# Patient Record
Sex: FEMALE | Race: BLACK OR AFRICAN AMERICAN | Employment: FULL TIME | URBAN - METROPOLITAN AREA
[De-identification: names, ages, dates, MRNs, and addresses within clinical notes are randomized per-mention and may not be internally consistent; named-entity substitution may affect disease eponyms.]

---

## 2017-03-31 PROBLEM — Z34.90 NORMAL PREGNANCY: Status: ACTIVE | Noted: 2017-03-31

## 2018-05-21 PROBLEM — Z37.9 NORMAL LABOR: Status: ACTIVE | Noted: 2018-05-21

## 2018-05-21 PROBLEM — Z34.90 NORMAL PREGNANCY: Status: RESOLVED | Noted: 2017-03-31 | Resolved: 2018-05-21

## 2018-05-29 PROBLEM — R51.9 HEADACHE: Status: ACTIVE | Noted: 2018-05-29

## 2019-06-10 LAB
HEP B, EXTERNAL RESULT: NEGATIVE
HIV, EXTERNAL RESULT: NORMAL
RPR, EXTERNAL RESULT: NEGATIVE
RUBELLA TITER, EXTERNAL RESULT: NORMAL

## 2019-11-22 LAB — GBS, EXTERNAL RESULT: POSITIVE

## 2019-12-23 ENCOUNTER — ANESTHESIA (OUTPATIENT)
Dept: LABOR AND DELIVERY | Age: 34
End: 2019-12-23
Payer: COMMERCIAL

## 2019-12-23 ENCOUNTER — HOSPITAL ENCOUNTER (INPATIENT)
Age: 34
LOS: 2 days | Discharge: HOME | End: 2019-12-25
Attending: OBSTETRICS & GYNECOLOGY | Admitting: OBSTETRICS & GYNECOLOGY
Payer: COMMERCIAL

## 2019-12-23 ENCOUNTER — ANESTHESIA EVENT (OUTPATIENT)
Dept: LABOR AND DELIVERY | Age: 34
End: 2019-12-23
Payer: COMMERCIAL

## 2019-12-23 ENCOUNTER — HOSPITAL ENCOUNTER (OUTPATIENT)
Age: 34
Discharge: HOME | End: 2019-12-23
Attending: OBSTETRICS & GYNECOLOGY | Admitting: OBSTETRICS & GYNECOLOGY
Payer: COMMERCIAL

## 2019-12-23 VITALS
SYSTOLIC BLOOD PRESSURE: 118 MMHG | TEMPERATURE: 97 F | HEART RATE: 85 BPM | HEIGHT: 65 IN | WEIGHT: 205 LBS | DIASTOLIC BLOOD PRESSURE: 69 MMHG | RESPIRATION RATE: 18 BRPM | BODY MASS INDEX: 34.16 KG/M2

## 2019-12-23 LAB
ABO/RH: NORMAL
AMPHETAMINE SCREEN, URINE: NORMAL
ANTIBODY SCREEN: NORMAL
BARBITURATE SCREEN URINE: NORMAL
BASOPHILS ABSOLUTE: 0 K/UL (ref 0–0.2)
BASOPHILS RELATIVE PERCENT: 0.3 %
BENZODIAZEPINE SCREEN, URINE: NORMAL
BUPRENORPHINE URINE: NORMAL
CANNABINOID SCREEN URINE: NORMAL
COCAINE METABOLITE SCREEN URINE: NORMAL
EOSINOPHILS ABSOLUTE: 0 K/UL (ref 0–0.6)
EOSINOPHILS RELATIVE PERCENT: 0.5 %
HCT VFR BLD CALC: 34.4 % (ref 36–48)
HEMOGLOBIN: 11.1 G/DL (ref 12–16)
LYMPHOCYTES ABSOLUTE: 2.5 K/UL (ref 1–5.1)
LYMPHOCYTES RELATIVE PERCENT: 30.5 %
Lab: NORMAL
MCH RBC QN AUTO: 26.5 PG (ref 26–34)
MCHC RBC AUTO-ENTMCNC: 32.4 G/DL (ref 31–36)
MCV RBC AUTO: 81.8 FL (ref 80–100)
METHADONE SCREEN, URINE: NORMAL
MONOCYTES ABSOLUTE: 0.5 K/UL (ref 0–1.3)
MONOCYTES RELATIVE PERCENT: 6.3 %
NEUTROPHILS ABSOLUTE: 5 K/UL (ref 1.7–7.7)
NEUTROPHILS RELATIVE PERCENT: 62.4 %
OPIATE SCREEN URINE: NORMAL
OXYCODONE URINE: NORMAL
PDW BLD-RTO: 17.2 % (ref 12.4–15.4)
PH UA: 6.5
PHENCYCLIDINE SCREEN URINE: NORMAL
PLATELET # BLD: 286 K/UL (ref 135–450)
PMV BLD AUTO: 6.9 FL (ref 5–10.5)
PROPOXYPHENE SCREEN: NORMAL
RBC # BLD: 4.2 M/UL (ref 4–5.2)
TOTAL SYPHILLIS IGG/IGM: NORMAL
WBC # BLD: 8.1 K/UL (ref 4–11)

## 2019-12-23 PROCEDURE — 6360000002 HC RX W HCPCS: Performed by: OBSTETRICS & GYNECOLOGY

## 2019-12-23 PROCEDURE — 3700000025 EPIDURAL BLOCK: Performed by: ANESTHESIOLOGY

## 2019-12-23 PROCEDURE — 7200000001 HC VAGINAL DELIVERY

## 2019-12-23 PROCEDURE — 99212 OFFICE O/P EST SF 10 MIN: CPT

## 2019-12-23 PROCEDURE — 86901 BLOOD TYPING SEROLOGIC RH(D): CPT

## 2019-12-23 PROCEDURE — 51701 INSERT BLADDER CATHETER: CPT

## 2019-12-23 PROCEDURE — 86780 TREPONEMA PALLIDUM: CPT

## 2019-12-23 PROCEDURE — 86900 BLOOD TYPING SEROLOGIC ABO: CPT

## 2019-12-23 PROCEDURE — 80307 DRUG TEST PRSMV CHEM ANLYZR: CPT

## 2019-12-23 PROCEDURE — 6360000002 HC RX W HCPCS: Performed by: ANESTHESIOLOGY

## 2019-12-23 PROCEDURE — 86850 RBC ANTIBODY SCREEN: CPT

## 2019-12-23 PROCEDURE — 2580000003 HC RX 258: Performed by: OBSTETRICS & GYNECOLOGY

## 2019-12-23 PROCEDURE — 85025 COMPLETE CBC W/AUTO DIFF WBC: CPT

## 2019-12-23 PROCEDURE — 2500000003 HC RX 250 WO HCPCS: Performed by: NURSE ANESTHETIST, CERTIFIED REGISTERED

## 2019-12-23 PROCEDURE — 59025 FETAL NON-STRESS TEST: CPT

## 2019-12-23 PROCEDURE — 6370000000 HC RX 637 (ALT 250 FOR IP): Performed by: OBSTETRICS & GYNECOLOGY

## 2019-12-23 PROCEDURE — 1220000000 HC SEMI PRIVATE OB R&B

## 2019-12-23 PROCEDURE — 2500000003 HC RX 250 WO HCPCS

## 2019-12-23 RX ORDER — HYDROCODONE BITARTRATE AND ACETAMINOPHEN 5; 325 MG/1; MG/1
1 TABLET ORAL EVERY 4 HOURS PRN
Status: DISCONTINUED | OUTPATIENT
Start: 2019-12-23 | End: 2019-12-25 | Stop reason: HOSPADM

## 2019-12-23 RX ORDER — HYDROCODONE BITARTRATE AND ACETAMINOPHEN 5; 325 MG/1; MG/1
2 TABLET ORAL EVERY 4 HOURS PRN
Status: DISCONTINUED | OUTPATIENT
Start: 2019-12-23 | End: 2019-12-25 | Stop reason: HOSPADM

## 2019-12-23 RX ORDER — ONDANSETRON 2 MG/ML
4 INJECTION INTRAMUSCULAR; INTRAVENOUS EVERY 6 HOURS PRN
Status: DISCONTINUED | OUTPATIENT
Start: 2019-12-23 | End: 2019-12-23 | Stop reason: HOSPADM

## 2019-12-23 RX ORDER — SODIUM CHLORIDE 0.9 % (FLUSH) 0.9 %
10 SYRINGE (ML) INJECTION EVERY 12 HOURS SCHEDULED
Status: DISCONTINUED | OUTPATIENT
Start: 2019-12-23 | End: 2019-12-25 | Stop reason: HOSPADM

## 2019-12-23 RX ORDER — ACETAMINOPHEN 325 MG/1
650 TABLET ORAL EVERY 4 HOURS PRN
Status: DISCONTINUED | OUTPATIENT
Start: 2019-12-23 | End: 2019-12-23 | Stop reason: HOSPADM

## 2019-12-23 RX ORDER — ONDANSETRON 2 MG/ML
4 INJECTION INTRAMUSCULAR; INTRAVENOUS EVERY 6 HOURS PRN
Status: DISCONTINUED | OUTPATIENT
Start: 2019-12-23 | End: 2019-12-23 | Stop reason: ALTCHOICE

## 2019-12-23 RX ORDER — SODIUM CHLORIDE 0.9 % (FLUSH) 0.9 %
10 SYRINGE (ML) INJECTION PRN
Status: DISCONTINUED | OUTPATIENT
Start: 2019-12-23 | End: 2019-12-25 | Stop reason: HOSPADM

## 2019-12-23 RX ORDER — DIPHENHYDRAMINE HYDROCHLORIDE 50 MG/ML
25 INJECTION INTRAMUSCULAR; INTRAVENOUS EVERY 6 HOURS PRN
Status: DISCONTINUED | OUTPATIENT
Start: 2019-12-23 | End: 2019-12-23 | Stop reason: ALTCHOICE

## 2019-12-23 RX ORDER — SODIUM CHLORIDE, SODIUM LACTATE, POTASSIUM CHLORIDE, CALCIUM CHLORIDE 600; 310; 30; 20 MG/100ML; MG/100ML; MG/100ML; MG/100ML
INJECTION, SOLUTION INTRAVENOUS CONTINUOUS
Status: DISCONTINUED | OUTPATIENT
Start: 2019-12-23 | End: 2019-12-25 | Stop reason: HOSPADM

## 2019-12-23 RX ORDER — IBUPROFEN 400 MG/1
800 TABLET ORAL EVERY 8 HOURS
Status: DISCONTINUED | OUTPATIENT
Start: 2019-12-23 | End: 2019-12-25 | Stop reason: HOSPADM

## 2019-12-23 RX ORDER — EPHEDRINE SULFATE 50 MG/ML
INJECTION INTRAVENOUS PRN
Status: DISCONTINUED | OUTPATIENT
Start: 2019-12-23 | End: 2019-12-23 | Stop reason: SDUPTHER

## 2019-12-23 RX ORDER — BUPIVACAINE HYDROCHLORIDE 2.5 MG/ML
INJECTION, SOLUTION EPIDURAL; INFILTRATION; INTRACAUDAL PRN
Status: DISCONTINUED | OUTPATIENT
Start: 2019-12-23 | End: 2019-12-23 | Stop reason: SDUPTHER

## 2019-12-23 RX ORDER — METHYLERGONOVINE MALEATE 0.2 MG/ML
200 INJECTION INTRAVENOUS
Status: COMPLETED | OUTPATIENT
Start: 2019-12-23 | End: 2019-12-23

## 2019-12-23 RX ORDER — SODIUM CHLORIDE 0.9 % (FLUSH) 0.9 %
10 SYRINGE (ML) INJECTION PRN
Status: DISCONTINUED | OUTPATIENT
Start: 2019-12-23 | End: 2019-12-23 | Stop reason: SDUPTHER

## 2019-12-23 RX ORDER — ACETAMINOPHEN 325 MG/1
650 TABLET ORAL EVERY 4 HOURS PRN
Status: DISCONTINUED | OUTPATIENT
Start: 2019-12-23 | End: 2019-12-23 | Stop reason: SDUPTHER

## 2019-12-23 RX ORDER — NALBUPHINE HCL 10 MG/ML
5 AMPUL (ML) INJECTION EVERY 4 HOURS PRN
Status: DISCONTINUED | OUTPATIENT
Start: 2019-12-23 | End: 2019-12-23 | Stop reason: ALTCHOICE

## 2019-12-23 RX ORDER — NALOXONE HYDROCHLORIDE 0.4 MG/ML
0.4 INJECTION, SOLUTION INTRAMUSCULAR; INTRAVENOUS; SUBCUTANEOUS PRN
Status: DISCONTINUED | OUTPATIENT
Start: 2019-12-23 | End: 2019-12-23 | Stop reason: ALTCHOICE

## 2019-12-23 RX ORDER — LANOLIN 100 %
OINTMENT (GRAM) TOPICAL PRN
Status: DISCONTINUED | OUTPATIENT
Start: 2019-12-23 | End: 2019-12-25 | Stop reason: HOSPADM

## 2019-12-23 RX ORDER — LIDOCAINE HYDROCHLORIDE AND EPINEPHRINE 20; 5 MG/ML; UG/ML
INJECTION, SOLUTION EPIDURAL; INFILTRATION; INTRACAUDAL; PERINEURAL PRN
Status: DISCONTINUED | OUTPATIENT
Start: 2019-12-23 | End: 2019-12-23 | Stop reason: SDUPTHER

## 2019-12-23 RX ORDER — SODIUM CHLORIDE 0.9 % (FLUSH) 0.9 %
10 SYRINGE (ML) INJECTION EVERY 12 HOURS SCHEDULED
Status: DISCONTINUED | OUTPATIENT
Start: 2019-12-23 | End: 2019-12-23 | Stop reason: SDUPTHER

## 2019-12-23 RX ORDER — DOCUSATE SODIUM 100 MG/1
100 CAPSULE, LIQUID FILLED ORAL 2 TIMES DAILY
Status: DISCONTINUED | OUTPATIENT
Start: 2019-12-23 | End: 2019-12-25 | Stop reason: HOSPADM

## 2019-12-23 RX ORDER — ACETAMINOPHEN 325 MG/1
650 TABLET ORAL EVERY 4 HOURS PRN
Status: DISCONTINUED | OUTPATIENT
Start: 2019-12-23 | End: 2019-12-25 | Stop reason: HOSPADM

## 2019-12-23 RX ADMIN — METHYLERGONOVINE MALEATE 200 MCG: 0.2 INJECTION, SOLUTION INTRAMUSCULAR; INTRAVENOUS at 10:27

## 2019-12-23 RX ADMIN — Medication 999 ML/HR: at 08:50

## 2019-12-23 RX ADMIN — SODIUM CHLORIDE, POTASSIUM CHLORIDE, SODIUM LACTATE AND CALCIUM CHLORIDE: 600; 310; 30; 20 INJECTION, SOLUTION INTRAVENOUS at 06:23

## 2019-12-23 RX ADMIN — Medication 10 ML: at 21:31

## 2019-12-23 RX ADMIN — SODIUM CHLORIDE, POTASSIUM CHLORIDE, SODIUM LACTATE AND CALCIUM CHLORIDE: 600; 310; 30; 20 INJECTION, SOLUTION INTRAVENOUS at 05:45

## 2019-12-23 RX ADMIN — BUPIVACAINE HYDROCHLORIDE 4 ML: 2.5 INJECTION, SOLUTION EPIDURAL; INFILTRATION; INTRACAUDAL at 06:38

## 2019-12-23 RX ADMIN — DEXTROSE MONOHYDRATE 5 MILLION UNITS: 5 INJECTION INTRAVENOUS at 06:34

## 2019-12-23 RX ADMIN — ACETAMINOPHEN 650 MG: 325 TABLET ORAL at 19:23

## 2019-12-23 RX ADMIN — LIDOCAINE HYDROCHLORIDE,EPINEPHRINE BITARTRATE 5 ML: 20; .005 INJECTION, SOLUTION EPIDURAL; INFILTRATION; INTRACAUDAL; PERINEURAL at 07:08

## 2019-12-23 RX ADMIN — SODIUM CHLORIDE, POTASSIUM CHLORIDE, SODIUM LACTATE AND CALCIUM CHLORIDE: 600; 310; 30; 20 INJECTION, SOLUTION INTRAVENOUS at 08:24

## 2019-12-23 RX ADMIN — BUPIVACAINE HYDROCHLORIDE 4 ML: 2.5 INJECTION, SOLUTION EPIDURAL; INFILTRATION; INTRACAUDAL at 06:35

## 2019-12-23 RX ADMIN — EPHEDRINE SULFATE 10 MG: 50 INJECTION INTRAVENOUS at 08:16

## 2019-12-23 RX ADMIN — DOCUSATE SODIUM 100 MG: 100 CAPSULE, LIQUID FILLED ORAL at 21:31

## 2019-12-23 RX ADMIN — ONDANSETRON 4 MG: 2 INJECTION INTRAMUSCULAR; INTRAVENOUS at 08:16

## 2019-12-23 ASSESSMENT — PAIN SCALES - GENERAL
PAINLEVEL_OUTOF10: 3
PAINLEVEL_OUTOF10: 4

## 2019-12-23 ASSESSMENT — PAIN DESCRIPTION - DESCRIPTORS
DESCRIPTORS: CRAMPING
DESCRIPTORS: CRAMPING;PRESSURE

## 2019-12-24 PROCEDURE — 6370000000 HC RX 637 (ALT 250 FOR IP): Performed by: OBSTETRICS & GYNECOLOGY

## 2019-12-24 PROCEDURE — 1220000000 HC SEMI PRIVATE OB R&B

## 2019-12-24 RX ADMIN — DOCUSATE SODIUM 100 MG: 100 CAPSULE, LIQUID FILLED ORAL at 19:58

## 2019-12-24 RX ADMIN — ACETAMINOPHEN 650 MG: 325 TABLET ORAL at 15:49

## 2019-12-24 RX ADMIN — ACETAMINOPHEN 650 MG: 325 TABLET ORAL at 19:58

## 2019-12-24 RX ADMIN — IBUPROFEN 800 MG: 400 TABLET, FILM COATED ORAL at 17:28

## 2019-12-24 RX ADMIN — IBUPROFEN 800 MG: 400 TABLET, FILM COATED ORAL at 09:42

## 2019-12-24 RX ADMIN — DOCUSATE SODIUM 100 MG: 100 CAPSULE, LIQUID FILLED ORAL at 09:42

## 2019-12-24 RX ADMIN — IBUPROFEN 800 MG: 400 TABLET, FILM COATED ORAL at 01:09

## 2019-12-24 ASSESSMENT — PAIN DESCRIPTION - FREQUENCY: FREQUENCY: INTERMITTENT

## 2019-12-24 ASSESSMENT — PAIN SCALES - GENERAL
PAINLEVEL_OUTOF10: 6
PAINLEVEL_OUTOF10: 5
PAINLEVEL_OUTOF10: 5
PAINLEVEL_OUTOF10: 4
PAINLEVEL_OUTOF10: 0

## 2019-12-24 ASSESSMENT — PAIN DESCRIPTION - LOCATION: LOCATION: ABDOMEN

## 2019-12-24 ASSESSMENT — PAIN DESCRIPTION - PAIN TYPE: TYPE: ACUTE PAIN

## 2019-12-24 ASSESSMENT — PAIN DESCRIPTION - PROGRESSION: CLINICAL_PROGRESSION: NOT CHANGED

## 2019-12-24 ASSESSMENT — PAIN DESCRIPTION - DESCRIPTORS: DESCRIPTORS: CRAMPING

## 2019-12-24 ASSESSMENT — PAIN - FUNCTIONAL ASSESSMENT: PAIN_FUNCTIONAL_ASSESSMENT: ACTIVITIES ARE NOT PREVENTED

## 2019-12-24 ASSESSMENT — PAIN DESCRIPTION - ORIENTATION: ORIENTATION: LOWER

## 2019-12-24 ASSESSMENT — PAIN DESCRIPTION - ONSET: ONSET: OTHER (COMMENT)

## 2019-12-25 VITALS
DIASTOLIC BLOOD PRESSURE: 65 MMHG | WEIGHT: 205 LBS | TEMPERATURE: 97.2 F | BODY MASS INDEX: 34.16 KG/M2 | HEIGHT: 65 IN | SYSTOLIC BLOOD PRESSURE: 103 MMHG | HEART RATE: 74 BPM | RESPIRATION RATE: 12 BRPM | OXYGEN SATURATION: 100 %

## 2019-12-25 PROCEDURE — 6370000000 HC RX 637 (ALT 250 FOR IP): Performed by: OBSTETRICS & GYNECOLOGY

## 2019-12-25 RX ADMIN — ACETAMINOPHEN 650 MG: 325 TABLET ORAL at 05:52

## 2019-12-25 RX ADMIN — DOCUSATE SODIUM 100 MG: 100 CAPSULE, LIQUID FILLED ORAL at 08:09

## 2019-12-25 RX ADMIN — IBUPROFEN 800 MG: 400 TABLET, FILM COATED ORAL at 01:58

## 2019-12-25 ASSESSMENT — PAIN DESCRIPTION - LOCATION
LOCATION: ABDOMEN

## 2019-12-25 ASSESSMENT — PAIN DESCRIPTION - PAIN TYPE
TYPE: ACUTE PAIN

## 2019-12-25 ASSESSMENT — PAIN - FUNCTIONAL ASSESSMENT
PAIN_FUNCTIONAL_ASSESSMENT: ACTIVITIES ARE NOT PREVENTED

## 2019-12-25 ASSESSMENT — PAIN DESCRIPTION - FREQUENCY
FREQUENCY: INTERMITTENT

## 2019-12-25 ASSESSMENT — PAIN SCALES - GENERAL
PAINLEVEL_OUTOF10: 0
PAINLEVEL_OUTOF10: 1
PAINLEVEL_OUTOF10: 0

## 2019-12-25 ASSESSMENT — PAIN DESCRIPTION - PROGRESSION
CLINICAL_PROGRESSION: NOT CHANGED
CLINICAL_PROGRESSION: GRADUALLY WORSENING
CLINICAL_PROGRESSION: RESOLVED

## 2019-12-25 ASSESSMENT — PAIN DESCRIPTION - ONSET
ONSET: ON-GOING
ONSET: ON-GOING

## 2019-12-25 ASSESSMENT — PAIN DESCRIPTION - ORIENTATION
ORIENTATION: LOWER

## 2019-12-25 ASSESSMENT — PAIN DESCRIPTION - DESCRIPTORS
DESCRIPTORS: CRAMPING

## 2019-12-27 ENCOUNTER — HOSPITAL ENCOUNTER (OUTPATIENT)
Age: 34
Discharge: HOME | End: 2019-12-27
Attending: OBSTETRICS & GYNECOLOGY | Admitting: OBSTETRICS & GYNECOLOGY
Payer: COMMERCIAL

## 2019-12-27 VITALS
HEIGHT: 65 IN | SYSTOLIC BLOOD PRESSURE: 130 MMHG | HEART RATE: 85 BPM | TEMPERATURE: 99.3 F | BODY MASS INDEX: 34.16 KG/M2 | DIASTOLIC BLOOD PRESSURE: 73 MMHG | WEIGHT: 205 LBS | RESPIRATION RATE: 18 BRPM

## 2019-12-27 LAB
BASOPHILS ABSOLUTE: 0 K/UL (ref 0–0.2)
BASOPHILS RELATIVE PERCENT: 0.3 %
BILIRUBIN URINE: NEGATIVE
BLOOD, URINE: ABNORMAL
CLARITY: ABNORMAL
COLOR: ABNORMAL
EOSINOPHILS ABSOLUTE: 0.1 K/UL (ref 0–0.6)
EOSINOPHILS RELATIVE PERCENT: 1.4 %
EPITHELIAL CELLS, UA: 4 /HPF (ref 0–5)
GLUCOSE URINE: NEGATIVE MG/DL
HCT VFR BLD CALC: 34.7 % (ref 36–48)
HEMOGLOBIN: 11.3 G/DL (ref 12–16)
KETONES, URINE: NEGATIVE MG/DL
LEUKOCYTE ESTERASE, URINE: ABNORMAL
LYMPHOCYTES ABSOLUTE: 2.1 K/UL (ref 1–5.1)
LYMPHOCYTES RELATIVE PERCENT: 21.8 %
MCH RBC QN AUTO: 26.4 PG (ref 26–34)
MCHC RBC AUTO-ENTMCNC: 32.6 G/DL (ref 31–36)
MCV RBC AUTO: 81 FL (ref 80–100)
MICROSCOPIC EXAMINATION: YES
MONOCYTES ABSOLUTE: 0.5 K/UL (ref 0–1.3)
MONOCYTES RELATIVE PERCENT: 4.9 %
NEUTROPHILS ABSOLUTE: 6.8 K/UL (ref 1.7–7.7)
NEUTROPHILS RELATIVE PERCENT: 71.6 %
NITRITE, URINE: NEGATIVE
PDW BLD-RTO: 17 % (ref 12.4–15.4)
PH UA: 8 (ref 5–8)
PLATELET # BLD: 271 K/UL (ref 135–450)
PMV BLD AUTO: 6.8 FL (ref 5–10.5)
PROTEIN UA: 100 MG/DL
RBC # BLD: 4.28 M/UL (ref 4–5.2)
RBC UA: >100 /HPF (ref 0–2)
SPECIFIC GRAVITY UA: 1.01 (ref 1–1.03)
URINE TYPE: ABNORMAL
UROBILINOGEN, URINE: 0.2 E.U./DL
WBC # BLD: 9.5 K/UL (ref 4–11)
WBC UA: 387 /HPF (ref 0–5)

## 2019-12-27 PROCEDURE — 81001 URINALYSIS AUTO W/SCOPE: CPT

## 2019-12-27 PROCEDURE — 6370000000 HC RX 637 (ALT 250 FOR IP): Performed by: OBSTETRICS & GYNECOLOGY

## 2019-12-27 PROCEDURE — 99213 OFFICE O/P EST LOW 20 MIN: CPT

## 2019-12-27 PROCEDURE — 85025 COMPLETE CBC W/AUTO DIFF WBC: CPT

## 2019-12-27 PROCEDURE — 51701 INSERT BLADDER CATHETER: CPT

## 2019-12-27 PROCEDURE — G0463 HOSPITAL OUTPT CLINIC VISIT: HCPCS

## 2019-12-27 RX ORDER — ACETAMINOPHEN 325 MG/1
1000 TABLET ORAL ONCE
COMMUNITY

## 2019-12-27 RX ORDER — IBUPROFEN 400 MG/1
800 TABLET ORAL EVERY 8 HOURS PRN
Status: DISCONTINUED | OUTPATIENT
Start: 2019-12-27 | End: 2019-12-27 | Stop reason: HOSPADM

## 2019-12-27 RX ORDER — ACETAMINOPHEN 500 MG
1000 TABLET ORAL EVERY 6 HOURS PRN
Status: DISCONTINUED | OUTPATIENT
Start: 2019-12-27 | End: 2019-12-27 | Stop reason: HOSPADM

## 2019-12-27 RX ORDER — IBUPROFEN 800 MG/1
800 TABLET ORAL ONCE
COMMUNITY
End: 2021-07-28

## 2019-12-27 RX ADMIN — IBUPROFEN 800 MG: 400 TABLET, FILM COATED ORAL at 17:43

## 2019-12-27 ASSESSMENT — PAIN DESCRIPTION - RADICULAR PAIN: RADICULAR_PAIN: ABSENT

## 2019-12-27 ASSESSMENT — PAIN DESCRIPTION - DESCRIPTORS
DESCRIPTORS: DISCOMFORT
DESCRIPTORS: DISCOMFORT

## 2019-12-27 ASSESSMENT — PAIN SCALES - GENERAL: PAINLEVEL_OUTOF10: 6

## 2020-03-28 ENCOUNTER — HOSPITAL ENCOUNTER (EMERGENCY)
Age: 35
Discharge: HOME | End: 2020-03-28
Payer: COMMERCIAL

## 2020-03-28 ENCOUNTER — APPOINTMENT (OUTPATIENT)
Dept: GENERAL RADIOLOGY | Age: 35
End: 2020-03-28
Payer: COMMERCIAL

## 2020-03-28 VITALS
DIASTOLIC BLOOD PRESSURE: 82 MMHG | SYSTOLIC BLOOD PRESSURE: 122 MMHG | WEIGHT: 183 LBS | HEIGHT: 65 IN | BODY MASS INDEX: 30.49 KG/M2 | RESPIRATION RATE: 12 BRPM | OXYGEN SATURATION: 98 % | HEART RATE: 99 BPM | TEMPERATURE: 102.1 F

## 2020-03-28 LAB
ANION GAP SERPL CALCULATED.3IONS-SCNC: 10 MMOL/L (ref 3–16)
BASOPHILS ABSOLUTE: 0 K/UL (ref 0–0.2)
BASOPHILS RELATIVE PERCENT: 0.3 %
BUN BLDV-MCNC: 12 MG/DL (ref 7–20)
CALCIUM SERPL-MCNC: 8.9 MG/DL (ref 8.3–10.6)
CHLORIDE BLD-SCNC: 103 MMOL/L (ref 99–110)
CO2: 23 MMOL/L (ref 21–32)
CREAT SERPL-MCNC: 0.9 MG/DL (ref 0.6–1.1)
EOSINOPHILS ABSOLUTE: 0 K/UL (ref 0–0.6)
EOSINOPHILS RELATIVE PERCENT: 0.1 %
GFR AFRICAN AMERICAN: >60
GFR NON-AFRICAN AMERICAN: >60
GLUCOSE BLD-MCNC: 90 MG/DL (ref 70–99)
HCG QUALITATIVE: NEGATIVE
HCT VFR BLD CALC: 41 % (ref 36–48)
HEMOGLOBIN: 13.6 G/DL (ref 12–16)
LYMPHOCYTES ABSOLUTE: 1.1 K/UL (ref 1–5.1)
LYMPHOCYTES RELATIVE PERCENT: 11.7 %
MCH RBC QN AUTO: 27.3 PG (ref 26–34)
MCHC RBC AUTO-ENTMCNC: 33.1 G/DL (ref 31–36)
MCV RBC AUTO: 82.5 FL (ref 80–100)
MONO TEST: NEGATIVE
MONOCYTES ABSOLUTE: 0.4 K/UL (ref 0–1.3)
MONOCYTES RELATIVE PERCENT: 4.8 %
NEUTROPHILS ABSOLUTE: 7.8 K/UL (ref 1.7–7.7)
NEUTROPHILS RELATIVE PERCENT: 83.1 %
PDW BLD-RTO: 16.7 % (ref 12.4–15.4)
PLATELET # BLD: 215 K/UL (ref 135–450)
PMV BLD AUTO: 7.4 FL (ref 5–10.5)
POTASSIUM SERPL-SCNC: 3.9 MMOL/L (ref 3.5–5.1)
RAPID INFLUENZA  B AGN: NEGATIVE
RAPID INFLUENZA A AGN: NEGATIVE
RBC # BLD: 4.96 M/UL (ref 4–5.2)
S PYO AG THROAT QL: POSITIVE
SODIUM BLD-SCNC: 136 MMOL/L (ref 136–145)
WBC # BLD: 9.3 K/UL (ref 4–11)

## 2020-03-28 PROCEDURE — 87880 STREP A ASSAY W/OPTIC: CPT

## 2020-03-28 PROCEDURE — 96374 THER/PROPH/DIAG INJ IV PUSH: CPT

## 2020-03-28 PROCEDURE — 6370000000 HC RX 637 (ALT 250 FOR IP): Performed by: PHYSICIAN ASSISTANT

## 2020-03-28 PROCEDURE — 80048 BASIC METABOLIC PNL TOTAL CA: CPT

## 2020-03-28 PROCEDURE — 6360000002 HC RX W HCPCS: Performed by: PHYSICIAN ASSISTANT

## 2020-03-28 PROCEDURE — 99283 EMERGENCY DEPT VISIT LOW MDM: CPT

## 2020-03-28 PROCEDURE — 85025 COMPLETE CBC W/AUTO DIFF WBC: CPT

## 2020-03-28 PROCEDURE — 71045 X-RAY EXAM CHEST 1 VIEW: CPT

## 2020-03-28 PROCEDURE — 36415 COLL VENOUS BLD VENIPUNCTURE: CPT

## 2020-03-28 PROCEDURE — 87804 INFLUENZA ASSAY W/OPTIC: CPT

## 2020-03-28 PROCEDURE — 86308 HETEROPHILE ANTIBODY SCREEN: CPT

## 2020-03-28 PROCEDURE — 2580000003 HC RX 258: Performed by: PHYSICIAN ASSISTANT

## 2020-03-28 PROCEDURE — 96361 HYDRATE IV INFUSION ADD-ON: CPT

## 2020-03-28 PROCEDURE — 84703 CHORIONIC GONADOTROPIN ASSAY: CPT

## 2020-03-28 RX ORDER — IBUPROFEN 600 MG/1
600 TABLET ORAL EVERY 6 HOURS PRN
Qty: 120 TABLET | Refills: 0 | Status: SHIPPED | OUTPATIENT
Start: 2020-03-28

## 2020-03-28 RX ORDER — DEXAMETHASONE SODIUM PHOSPHATE 10 MG/ML
10 INJECTION, SOLUTION INTRAMUSCULAR; INTRAVENOUS ONCE
Status: COMPLETED | OUTPATIENT
Start: 2020-03-28 | End: 2020-03-28

## 2020-03-28 RX ORDER — AMOXICILLIN AND CLAVULANATE POTASSIUM 875; 125 MG/1; MG/1
1 TABLET, FILM COATED ORAL 2 TIMES DAILY
Qty: 20 TABLET | Refills: 0 | Status: SHIPPED | OUTPATIENT
Start: 2020-03-28 | End: 2020-04-07

## 2020-03-28 RX ORDER — KETOROLAC TROMETHAMINE 30 MG/ML
30 INJECTION, SOLUTION INTRAMUSCULAR; INTRAVENOUS ONCE
Status: COMPLETED | OUTPATIENT
Start: 2020-03-28 | End: 2020-03-28

## 2020-03-28 RX ORDER — ACETAMINOPHEN 500 MG
1000 TABLET ORAL ONCE
Status: COMPLETED | OUTPATIENT
Start: 2020-03-28 | End: 2020-03-28

## 2020-03-28 RX ORDER — AMOXICILLIN AND CLAVULANATE POTASSIUM 875; 125 MG/1; MG/1
1 TABLET, FILM COATED ORAL ONCE
Status: COMPLETED | OUTPATIENT
Start: 2020-03-28 | End: 2020-03-28

## 2020-03-28 RX ORDER — 0.9 % SODIUM CHLORIDE 0.9 %
1000 INTRAVENOUS SOLUTION INTRAVENOUS ONCE
Status: COMPLETED | OUTPATIENT
Start: 2020-03-28 | End: 2020-03-28

## 2020-03-28 RX ORDER — ACETAMINOPHEN 325 MG/1
650 TABLET ORAL EVERY 6 HOURS PRN
Qty: 120 TABLET | Refills: 0 | Status: SHIPPED | OUTPATIENT
Start: 2020-03-28 | End: 2021-07-28

## 2020-03-28 RX ADMIN — ACETAMINOPHEN 1000 MG: 500 TABLET ORAL at 13:18

## 2020-03-28 RX ADMIN — AMOXICILLIN AND CLAVULANATE POTASSIUM 1 TABLET: 875; 125 TABLET, FILM COATED ORAL at 14:33

## 2020-03-28 RX ADMIN — DEXAMETHASONE SODIUM PHOSPHATE 10 MG: 10 INJECTION, SOLUTION INTRAMUSCULAR; INTRAVENOUS at 14:33

## 2020-03-28 RX ADMIN — SODIUM CHLORIDE 1000 ML: 9 INJECTION, SOLUTION INTRAVENOUS at 13:18

## 2020-03-28 RX ADMIN — KETOROLAC TROMETHAMINE 30 MG: 30 INJECTION, SOLUTION INTRAMUSCULAR at 14:33

## 2020-03-28 ASSESSMENT — ENCOUNTER SYMPTOMS
TROUBLE SWALLOWING: 0
SINUS PRESSURE: 0
SORE THROAT: 1
COUGH: 0
BACK PAIN: 0
SINUS PAIN: 0
CHEST TIGHTNESS: 0
DIARRHEA: 0
ABDOMINAL PAIN: 0
COLOR CHANGE: 0
RHINORRHEA: 0
NAUSEA: 0
VOMITING: 0
VOICE CHANGE: 0
EYE REDNESS: 0
CONSTIPATION: 0
SHORTNESS OF BREATH: 0
EYE DISCHARGE: 0
RESPIRATORY NEGATIVE: 1

## 2020-03-28 ASSESSMENT — PAIN SCALES - GENERAL
PAINLEVEL_OUTOF10: 7
PAINLEVEL_OUTOF10: 7

## 2020-03-28 ASSESSMENT — PAIN DESCRIPTION - DESCRIPTORS: DESCRIPTORS: DISCOMFORT

## 2020-03-28 ASSESSMENT — PAIN DESCRIPTION - LOCATION: LOCATION: THROAT

## 2020-03-28 ASSESSMENT — PAIN DESCRIPTION - PAIN TYPE: TYPE: ACUTE PAIN

## 2020-03-28 NOTE — ED PROVIDER NOTES
Performed at:  [facility]  [address]  [place], [address] [facility]   Phone [phone]   HCG, SERUM, QUALITATIVE    Narrative:     Performed at:  [facility]  [address],  [place], [address]   Phone [phone]       All other labs were within normal range or not returned as of this dictation. EKG: All EKG's are interpreted by the Emergency Department Physician in the absence of a cardiologist.  Please see their note for interpretation of EKG. RADIOLOGY:   Non-plain film images such as CT, Ultrasound and MRI are read by the radiologist. Plain radiographic images are visualized and preliminarily interpreted by the ED Provider with the below findings:        Interpretation per the Radiologist below, if available at the time of this note:    XR CHEST PORTABLE   Final Result   No acute process. Xr Chest Portable    Result Date: 3/28/2020  EXAMINATION: ONE XRAY VIEW OF THE CHEST 3/28/2020 1:05 pm COMPARISON: None. HISTORY: ORDERING SYSTEM PROVIDED HISTORY: fever TECHNOLOGIST PROVIDED HISTORY: Reason for exam:->fever Reason for Exam: Pharyngitis (pt c/o fever and sore throat since last night. Pt temp 102.1 during triage. Pt denies SOB or cough. ) Acuity: Unknown Type of Exam: Unknown FINDINGS: The lungs are without acute focal process. There is no effusion or pneumothorax. The cardiomediastinal silhouette is without acute process. The osseous structures are without acute process. No acute process.            PROCEDURES   Unless otherwise noted below, none     Procedures    CRITICAL CARE TIME   N/A    CONSULTS:  None      EMERGENCY DEPARTMENT COURSE and DIFFERENTIAL DIAGNOSIS/MDM:   Vitals:    Vitals:    03/28/20 1330 03/28/20 1345 03/28/20 1400 03/28/20 1445   BP: 129/85 126/79 127/81 122/82   Pulse: 103 106 107 99   Resp: 20 14 12    Temp:       TempSrc:       SpO2: 97% 98% 98%    Weight:       Height:           Patient was

## 2020-09-22 ENCOUNTER — OFFICE VISIT (OUTPATIENT)
Dept: INTERNAL MEDICINE CLINIC | Age: 35
End: 2020-09-22
Payer: COMMERCIAL

## 2020-09-22 VITALS
WEIGHT: 175.8 LBS | SYSTOLIC BLOOD PRESSURE: 126 MMHG | BODY MASS INDEX: 29.29 KG/M2 | TEMPERATURE: 97.3 F | OXYGEN SATURATION: 98 % | HEIGHT: 65 IN | HEART RATE: 74 BPM | DIASTOLIC BLOOD PRESSURE: 64 MMHG

## 2020-09-22 PROCEDURE — 99395 PREV VISIT EST AGE 18-39: CPT | Performed by: INTERNAL MEDICINE

## 2020-09-22 ASSESSMENT — PATIENT HEALTH QUESTIONNAIRE - PHQ9
SUM OF ALL RESPONSES TO PHQ9 QUESTIONS 1 & 2: 0
SUM OF ALL RESPONSES TO PHQ QUESTIONS 1-9: 0
1. LITTLE INTEREST OR PLEASURE IN DOING THINGS: 0
2. FEELING DOWN, DEPRESSED OR HOPELESS: 0
SUM OF ALL RESPONSES TO PHQ QUESTIONS 1-9: 0

## 2020-09-22 NOTE — PROGRESS NOTES
Subjective:      Patient ID: Alicja Perales is a 37 y.o. female. HPI Here for a physical. She complains of a knot on her back for 1 year that has not changed. Also knot on right thigh. No change. No h/o trauma. Right hip is bothering her. Hurts with walking and running. No past injury. It also has given out. Twice while walking on vacation. Declines routine vaccines. Had pap smear at [facility]. Everything was ok. Also complains of protuberant abdomen. Has had 4 children but laxity of abd wall she thinks is abnormal.        Review of Systems   Constitutional: Negative. HENT: Negative. Eyes: Negative. Respiratory: Negative. Cardiovascular: Negative. Gastrointestinal:        Protuberant lax abdomen. Minimal abdominal muscle contractibility. Genitourinary: Negative. Musculoskeletal: Negative. Right mid back and right thigh nodules, firm, moveable. Not rock hard. Non tender. Cystic in nature. 1 cm or so in size. Skin: Negative. Neurological: Negative. Psychiatric/Behavioral: Negative. Objective:   Physical Exam  Constitutional:       General: She is not in acute distress. Appearance: She is well-developed. HENT:      Head: Normocephalic and atraumatic. Right Ear: External ear normal.      Left Ear: External ear normal.      Nose: Nose normal.   Eyes:      General: No scleral icterus. Conjunctiva/sclera: Conjunctivae normal.      Pupils: Pupils are equal, round, and reactive to light. Neck:      Musculoskeletal: Normal range of motion and neck supple. Thyroid: No thyromegaly. Cardiovascular:      Rate and Rhythm: Normal rate and regular rhythm. Heart sounds: Normal heart sounds. Pulmonary:      Effort: Pulmonary effort is normal.      Breath sounds: Normal breath sounds. Abdominal:      General: Bowel sounds are normal.      Palpations: Abdomen is soft. There is no mass.       Comments: See ROS exam.    Musculoskeletal:      Comments: Right hip pain with abduction. Tender to palpate joint. rom ok. Lymphadenopathy:      Cervical: No cervical adenopathy. Skin:     General: Skin is warm and dry. Comments: See ROS. Neurological:      Mental Status: She is alert and oriented to person, place, and time. Deep Tendon Reflexes: Reflexes are normal and symmetric. Psychiatric:         Behavior: Behavior normal.         Thought Content: Thought content normal.         Judgment: Judgment normal.         Assessment:        Diagnosis Orders   1. Physical exam  Health and wellness advice given. Questions answered. Literature provided. 2. Right hip pain  XR HIP RIGHT (2-3 VIEWS)    External Referral To Orthopedic Surgery  Exercises given. 3. Laxity of abdominal wall  Referral to PT. 4. Skin lesions  Benign, non changing. Derm f/u if change occurs. Plan:    See plans above.          Feliz Lindo MD

## 2020-10-04 ASSESSMENT — ENCOUNTER SYMPTOMS
RESPIRATORY NEGATIVE: 1
EYES NEGATIVE: 1

## 2020-10-21 ENCOUNTER — HOSPITAL ENCOUNTER (OUTPATIENT)
Age: 35
Discharge: HOME | End: 2020-10-21
Payer: COMMERCIAL

## 2020-10-21 ENCOUNTER — HOSPITAL ENCOUNTER (OUTPATIENT)
Dept: GENERAL RADIOLOGY | Age: 35
Discharge: HOME | End: 2020-10-21
Payer: COMMERCIAL

## 2020-10-21 ENCOUNTER — TELEPHONE (OUTPATIENT)
Dept: INTERNAL MEDICINE CLINIC | Age: 35
End: 2020-10-21

## 2020-10-21 PROCEDURE — 73502 X-RAY EXAM HIP UNI 2-3 VIEWS: CPT

## 2020-10-21 NOTE — TELEPHONE ENCOUNTER
Patient got xray done today at [facility] would like a call about results, but thinks that here issues are muscular and may need a MRI please advise.

## 2020-10-21 NOTE — TELEPHONE ENCOUNTER
Pt had x-ray done today at [facility] pt think her issues is muscular and need a mri please advise

## 2020-10-23 ENCOUNTER — TELEPHONE (OUTPATIENT)
Dept: INTERNAL MEDICINE CLINIC | Age: 35
End: 2020-10-23

## 2020-10-30 ENCOUNTER — HOSPITAL ENCOUNTER (OUTPATIENT)
Dept: PHYSICAL THERAPY | Age: 35
Setting detail: THERAPIES SERIES
Discharge: HOME | End: 2020-10-30
Payer: COMMERCIAL

## 2020-10-30 PROCEDURE — 97140 MANUAL THERAPY 1/> REGIONS: CPT

## 2020-10-30 PROCEDURE — 97161 PT EVAL LOW COMPLEX 20 MIN: CPT

## 2020-10-30 PROCEDURE — 97110 THERAPEUTIC EXERCISES: CPT

## 2020-10-30 ASSESSMENT — PAIN SCALES - GENERAL: PAINLEVEL_OUTOF10: 3

## 2020-10-30 NOTE — PLAN OF CARE
stability.)                  [x] Modalities (For modulating pain/tenderness/paresthesias, reducing swelling/inflammation/tightness, improving soft tissue extensibility, and/or to increase muscle tone/strength):     [] Ultrasound  [] Electrical Stimulation        [] Cervical Traction [] Lumbar Traction       [] Cold/hotpack [] Iontophoresis   Other:      []          []      Assessment:  Conditions Requiring Skilled Therapeutic Intervention  Body structures, Functions, Activity limitations: Decreased ROM, Decreased strength  Assessment: Pt presents with R hip pain. Onset of pain began July 2020 after pt was running one day. Pt presents with R hip decreased ROM and decreased strength. Obers test was negative. Pt was tender to palpation superior to greater trochanter and inferior to ASIS. Pt tolerated stretching well as well as manual roller stick to R hip and R thigh. Encouraged pt to rest and apply ice when needed. Feel pt will benefit from skilled physical therapy services to address limitations and return to PLOF. Treatment Diagnosis: R hip decreased ROM and strength  Prognosis: Good  Decision Making: Low Complexity  REQUIRES PT FOLLOW UP: Yes    Goals:  Short term goals  Time Frame for Short term goals: In 3 weeks pt will demonstrate  Short term goal 1: Increase R hip flex, IR strength 4/5  Short term goal 2: Increase R hip abd strength 3+/5  Short term goal 3: Increase R hip flex ROM >100 degrees  Short term goal 4: Increase R hip IR ROM >30 degrees  Short term goal 5: Demonstrate knowledge and understanding of HEP  Long term goals  Time Frame for Long term goals : In 6 weeks pt will demonstrate  Long term goal 1: Increase R hip flex, IR strength 5/5  Long term goal 2: Increase R hip abd strength 4+/5  Long term goal 3: Increase R hip flex ROM >120 degrees  Long term goal 4: Increase R hip IR ROM >40 degrees  Long term goal 5:  Increase LEFS score >75/80    Frequency/Duration:  # Days per week: [] 1 day # Weeks: [] 1 week [] 5 weeks     [x] 2 days   [] 2 weeks [x] 6 weeks     [] 3 days   [] 3 weeks [] 7 weeks     [] 4 days   [] 4 weeks [] 8 weeks    Rehab Potential: [] Excellent [x] Good [] Fair  [] Poor       Electronically signed by:  Yung Obrien, Student PT        If you have any questions or concerns, please don't hesitate to call.   Thank you for your referral.      Physician Signature:________________________________Date:__________________  By signing above, therapists plan is approved by physician

## 2020-10-30 NOTE — PROGRESS NOTES
Physical Therapy  Initial Assessment  Date: 10/30/2020  Patient Name: Martin Meyers  MRN: [id number]  : 1985     Treatment Diagnosis: R hip decreased ROM and strength    Restrictions       Subjective   General  Chart Reviewed: Yes  Additional Pertinent Hx: postpartum HTN, anemia  Family / Caregiver Present: No  Referring Practitioner: Angelito Garcia MD  Referral Date : 10/26/20  Diagnosis: M25.551 (ICD-10-CM) - Right hip pain  Follows Commands: Within Functional Limits  General Comment  Comments: PLOF: independent working adult  PT Visit Information  Total # of Visits Approved: 30  Subjective  Subjective: Pt presents with R hip pain. Onset of pain began 2020 after pt was running one day. Pt c/o throbbing/dull pain constantly in R hip and occasionally down R thigh and below knee. Pt pain at rest is 3/10 and at its worst is 6/10. Pt states stretching and ice makes the pain better and occasional use of ibuprohen provides relief temporarily. Pt states sitting and walking for long periods of time makes the pain worse. Pt states she use to run ~1 mile 3x per week outside but has not been able to since pain began.   Pain Screening  Patient Currently in Pain: Yes  Pain Assessment  Pain Assessment: 0-10  Pain Level: 3  Vital Signs  Patient Currently in Pain: Yes    Vision/Hearing  Vision  Vision: Within Functional Limits  Hearing  Hearing: Within functional limits    Orientation  Orientation  Overall Orientation Status: Within Normal Limits    Social/Functional History  Social/Functional History  Lives With: Family  Type of Home: House  Active : Yes  Occupation: Full time employment  Type of occupation:   Leisure & Hobbies: Walking, running    Objective          AROM RLE (degrees)  R Hip Flexion 0-125: 89  R Hip External Rotation 0-45: 38  R Hip Internal Rotation 0-45: 22  AROM LLE (degrees)  L Hip Flexion 0-125: 108  L Hip External Rotation 0-45: 36  L Hip Internal Rotation 0-45: 43    Strength RLE  R Hip Flexion: 3+/5  R Hip ABduction: 2+/5  R Hip Internal Rotation: 3/5  R Hip External Rotation: 4+/5  Strength LLE  L Hip Flexion: 5/5  L Hip ABduction: 5/5  L Hip Internal Rotation: 5/5  L Hip External Rotation: 5/5     Additional Measures  Special Tests: Obers: negative                                             Assessment   Conditions Requiring Skilled Therapeutic Intervention  Body structures, Functions, Activity limitations: Decreased ROM; Decreased strength  Assessment: Pt presents with R hip pain. Onset of pain began July 2020 after pt was running one day. Pt presents with R hip decreased ROM and decreased strength. Obers test was negative. Pt was tender to palpation superior to greater trochanter and inferior to ASIS. Pt tolerated stretching well as well as manual roller stick to R hip and R thigh. Encouraged pt to rest and apply ice when needed. Feel pt will benefit from skilled physical therapy services to address limitations and return to PLOF. Treatment Diagnosis: R hip decreased ROM and strength  Prognosis: Good  Decision Making: Low Complexity  REQUIRES PT FOLLOW UP: Yes  Activity Tolerance  Activity Tolerance: Patient Tolerated treatment well         Plan   Plan  Times per week: 2  Plan weeks: 6  Current Treatment Recommendations: Strengthening, ROM, Manual Therapy - Soft Tissue Mobilization, Patient/Caregiver Education & Training, Home Exercise Program, Modalities           OutComes Score  LEFS Total Score: 70 (10/30/20 1020)                                                            Goals  Short term goals  Time Frame for Short term goals: In 3 weeks pt will demonstrate  Short term goal 1: Increase R hip flex, IR strength 4/5  Short term goal 2: Increase R hip abd strength 3+/5  Short term goal 3: Increase R hip flex ROM >100 degrees  Short term goal 4:  Increase R hip IR ROM >30 degrees  Short term goal 5: Demonstrate knowledge and understanding of HEP  Long term goals  Time Frame for Long term goals : In 6 weeks pt will demonstrate  Long term goal 1: Increase R hip flex, IR strength 5/5  Long term goal 2: Increase R hip abd strength 4+/5  Long term goal 3: Increase R hip flex ROM >120 degrees  Long term goal 4: Increase R hip IR ROM >40 degrees  Long term goal 5:  Increase LEFS score >75/80  Patient Goals   Patient goals : Decrease pain, increase strength       Therapy Time   Individual Concurrent Group Co-treatment   Time In 0900         Time Out 0945         Minutes 45         Timed Code Treatment Minutes: Marcus Renae 177, Student PT

## 2020-10-30 NOTE — FLOWSHEET NOTE
Physical Therapy Daily Treatment Note  Date:  10/30/2020    Patient Name:  Danna Hutchins    :  1985  MRN: [id number]  Restrictions/Precautions:        Medical/Treatment Diagnosis Information:  · Diagnosis: M25.551 (ICD-10-CM) - Right hip pain  · Treatment Diagnosis: R hip decreased ROM and strength  Insurance/Certification information:     Insurance Allowable Visits:    Physician Information:  Referring Practitioner: Telma Saenz MD MD Follow-up Visit:   Plan of care signed (Y/N):  Sent to inbox   Visit# / total visits:    Pain level: 3/10     Progress Note Due (10 visits or 30 days, whichever is less):    Recertification Note Due (End of POC or 90 days, whichever is less):  20    Subjective:    Subjective  Subjective: Pt presents with R hip pain. Onset of pain began 2020 after pt was running one day. Pt c/o throbbing/dull pain constantly in R hip and occasionally down R thigh and below knee. Pt pain at rest is 3/10 and at its worst is 6/10. Pt states stretching and ice makes the pain better and occasional use of ibuprohen provides relief temporarily. Pt states sitting and walking for long periods of time makes the pain worse. Pt states she use to run ~1 mile 3x per week outside but has not been able to since pain began. Pain Screening  Patient Currently in Pain: Yes  Pain Assessment  Pain Assessment: 0-10  Pain Level: 3     Objective:   Observation:    Test measurements:      10/30/20   R hip flex: 89 degrees    R hip IR: 22 degrees    R hip ER: 38 degrees    R hip flex strength: 3+/5   R hip ER strength: 4+/5   R hip IR strength: 3/5   R hip abd strength: 2+/5    Exercises, Neuro Facilitation & Gait Training ([id number], [id number], [id number]):   Activity Resistance/Repetitions Other comments   IT band stretch 3 x 30\" Cueing for technique    Piriformis stretch  3 x 30\" Cueing for technique                                                                  Therapeutic Activities Hold pending MD visit [] Discharge    Plan for Next Session:      Electronically signed by:  Salo Claudio, Student PT

## 2020-11-09 ENCOUNTER — HOSPITAL ENCOUNTER (OUTPATIENT)
Dept: PHYSICAL THERAPY | Age: 35
Setting detail: THERAPIES SERIES
Discharge: HOME | End: 2020-11-09
Payer: COMMERCIAL

## 2020-11-09 NOTE — CARE COORDINATION
Physical Therapy  Cancellation/No-show Note  Patient Name:  Virginie Bahena  :  1985   Date:  2020  MRN: [id number]  Cancelled visits to date: 0  No-shows to date: 0    For today's appointment patient:  []  Cancelled  []  Rescheduled appointment  [x]  No-show     Reason given by patient:  []  Patient ill  []  Conflicting appointment  []  No transportation    []  Conflict with work  []  No reason given  []  Other:     Comments:      Electronically signed by:  Lucina Villarreal PT

## 2020-11-11 ENCOUNTER — HOSPITAL ENCOUNTER (OUTPATIENT)
Dept: PHYSICAL THERAPY | Age: 35
Setting detail: THERAPIES SERIES
Discharge: HOME | End: 2020-11-11
Payer: COMMERCIAL

## 2020-11-11 PROCEDURE — 97110 THERAPEUTIC EXERCISES: CPT

## 2020-11-12 NOTE — FLOWSHEET NOTE
minutes    Treatment/Activity Tolerance:  [x] Patient tolerated treatment well [] Patient limited by fatigue  [] Patient limited by pain  [] Patient limited by other medical complications  [] Other:     Assessment: tolerated stretches with overall improvement in mobility and and decreased pain with moderate stretch felt throughout hip rotators and ITB. Pt verbalized understanding of written HEP provided. Prognosis: [x] Good [] Fair  [] Poor    Patient Requires Follow-up: [x] Yes  [] No    Goals:  Short term goals  Time Frame for Short term goals: In 3 weeks pt will demonstrate  Short term goal 1: Increase R hip flex, IR strength 4/5  Short term goal 2: Increase R hip abd strength 3+/5  Short term goal 3: Increase R hip flex ROM >100 degrees  Short term goal 4: Increase R hip IR ROM >30 degrees  Short term goal 5: Demonstrate knowledge and understanding of HEP  Long term goals  Time Frame for Long term goals : In 6 weeks pt will demonstrate  Long term goal 1: Increase R hip flex, IR strength 5/5  Long term goal 2: Increase R hip abd strength 4+/5  Long term goal 3: Increase R hip flex ROM >120 degrees  Long term goal 4: Increase R hip IR ROM >40 degrees  Long term goal 5:  Increase LEFS score >75/80    Plan:   Visits per week:  2  # of weeks:  6    [] Continue per plan of care [] Alter current plan (see comments)  [x] Plan of care initiated [] Hold pending MD visit [] Discharge    Plan for Next Session:  Progress hip strength, manual ITB stretch/STM    Electronically signed by:  Hari Dixon, Student PT

## 2020-11-16 ENCOUNTER — HOSPITAL ENCOUNTER (OUTPATIENT)
Dept: PHYSICAL THERAPY | Age: 35
Setting detail: THERAPIES SERIES
Discharge: HOME | End: 2020-11-16
Payer: COMMERCIAL

## 2020-11-16 PROCEDURE — 97110 THERAPEUTIC EXERCISES: CPT

## 2020-11-16 NOTE — FLOWSHEET NOTE
Physical Therapy Daily Treatment Note  Date:  2020    Patient Name:  Kerry Cox    :  1985  MRN: [id number]  Restrictions/Precautions:        Medical/Treatment Diagnosis Information:  · Diagnosis: M25.551 (ICD-10-CM) - Right hip pain  · Treatment Diagnosis: R hip decreased ROM and strength  Insurance/Certification information:     Insurance Allowable Visits:    Physician Information:  Referring Practitioner: Preston Rasheed MD MD Follow-up Visit:   Plan of care signed (Y/N):  Sent to inbox   Visit# / total visits:  3/12  Pain level: 2/10     Progress Note Due (10 visits or 30 days, whichever is less):    Recertification Note Due (End of POC or 90 days, whichever is less):  20    Subjective:  Pt reports doing pretty good since last seen, pain 2/10 R hip, reports good compliance with HEP      Pain Screening  Patient Currently in Pain: Yes  Pain Assessment  Pain Assessment: 0-10  Pain Level: 2    Objective:   Observation:    Test measurements:      10/30/20   R hip flex: 89 degrees    R hip IR: 22 degrees    R hip ER: 38 degrees    R hip flex strength: 3+/5   R hip ER strength: 4+/5   R hip IR strength: 3/5   R hip abd strength: 2+/5    Exercises, Neuro Facilitation & Gait Training [phone], [id number], [id number]):   Activity Resistance/Repetitions Other comments   IT band stretch sidelying 3 x 30\" Cueing for technique    Piriformis stretch IR/ER 3 x 30\" Cueing for technique    Trunk rotations X 3 ea direction    Bridges on ball X 15    Prone bent knee lift 10    sidelying abduction 15    Side stepping 30-40'                                         Therapeutic Activities (23650):      Home Exercise Program:  Initiated glut med/max strengthening with instruction in progression of program 5 reps every other day    Manual Treatments (94545):      Modalities:  NA    Timed Code Treatment Minutes:   TE x 38 min    Total Treatment Minutes:  38 minutes    Treatment/Activity Tolerance:  [x] Patient tolerated treatment well [] Patient limited by fatigue  [] Patient limited by pain  [] Patient limited by other medical complications  [] Other:     Assessment: tolerates stretching well, demonstrating good carry over with instruction of new stretches taught last session. R hip IR 35 deg, ER 45. Initiated strengthening of hip abduction and extension with moderated difficulty completing 10 reps and significant \"burning\" in muscle . Pt verbalized understanding of written HEP provided. Prognosis: [x] Good [] Fair  [] Poor    Patient Requires Follow-up: [x] Yes  [] No    Goals:  Short term goals  Time Frame for Short term goals: In 3 weeks pt will demonstrate  Short term goal 1: Increase R hip flex, IR strength 4/5  Short term goal 2: Increase R hip abd strength 3+/5  Short term goal 3: Increase R hip flex ROM >100 degrees  Short term goal 4: Increase R hip IR ROM >30 degrees- met 11/16  Short term goal 5: Demonstrate knowledge and understanding of HEP  Long term goals  Time Frame for Long term goals : In 6 weeks pt will demonstrate  Long term goal 1: Increase R hip flex, IR strength 5/5  Long term goal 2: Increase R hip abd strength 4+/5  Long term goal 3: Increase R hip flex ROM >120 degrees  Long term goal 4: Increase R hip IR ROM >40 degrees  Long term goal 5:  Increase LEFS score >75/80    Plan:   Visits per week:  2  # of weeks:  6    [] Continue per plan of care [] Alter current plan (see comments)  [x] Plan of care initiated [] Hold pending MD visit [] Discharge    Plan for Next Session:  Progress hip strength, manual ITB stretch/STM    Electronically signed by:  Anitra Fatima, Student PT

## 2020-11-18 ENCOUNTER — APPOINTMENT (OUTPATIENT)
Dept: PHYSICAL THERAPY | Age: 35
End: 2020-11-18
Payer: COMMERCIAL

## 2020-11-23 ENCOUNTER — HOSPITAL ENCOUNTER (OUTPATIENT)
Dept: PHYSICAL THERAPY | Age: 35
Setting detail: THERAPIES SERIES
Discharge: HOME | End: 2020-11-23
Payer: COMMERCIAL

## 2020-11-23 PROCEDURE — 97140 MANUAL THERAPY 1/> REGIONS: CPT

## 2020-11-23 PROCEDURE — 97110 THERAPEUTIC EXERCISES: CPT

## 2020-11-23 NOTE — FLOWSHEET NOTE
Physical Therapy Daily Treatment Note  Date:  2020    Patient Name:  Adrianne Kirby    :  1985  MRN: [id number]  Restrictions/Precautions:        Medical/Treatment Diagnosis Information:  · Diagnosis: M25.551 (ICD-10-CM) - Right hip pain  · Treatment Diagnosis: R hip decreased ROM and strength  Insurance/Certification information:     Insurance Allowable Visits:    Physician Information:  Referring Practitioner: Dejah Linn MD MD Follow-up Visit:   Plan of care signed (Y/N):  Sent to inbox   Visit# / total visits:    Pain level: 0-1/10     Progress Note Due (10 visits or 30 days, whichever is less):    Recertification Note Due (End of POC or 90 days, whichever is less):  20    Subjective:  Pt reports good compliance with HEP, denies pain except when side stepping R feels and then only c/o /10 pain in R hip, feels like she is ready to run again      Pain Screening  Patient Currently in Pain: Yes  Pain Assessment  Pain Assessment: 0-10  Pain Level: 0-1    Objective:   Observation: mild palpable tenderness R IT band, demonstrating normal gt sequence   Test measurements:      10/30/20   R hip flex: WNL(I knee to chest)    R hip IR: 43 degrees    R hip ER: 45 degrees    R hip flex strength: 4/5   R hip ER strength: 4+/5   R hip IR strength: 3+/5   R hip abd strength: 3+/5    Exercises, Neuro Facilitation & Gait Training [phone], [id number], [id number]):   Activity Resistance/Repetitions Other comments   IT band stretch sidelying & cross over 3 x 30\" Cueing for technique    Piriformis stretch IR/ER 3 x 30\" Cueing for technique         Bridges + ER w/green tband X 15    Prone bent knee lift 10    sidelying abduction 15    Side stepping 30-40'                                         Therapeutic Activities (25322):      Home Exercise Program:  Progressed glut strengthening to bridging with knee fall outs using green tband    Manual Treatments (11738):  ITB STM/passive stretching in sidelying    Modalities:  NA    Timed Code Treatment Minutes:   MT x 14, TE x 29    Total Treatment Minutes:  43 minutes    Treatment/Activity Tolerance:  [x] Patient tolerated treatment well [] Patient limited by fatigue  [] Patient limited by pain  [] Patient limited by other medical complications  [] Other:     Assessment: Pt demonstrating ROM WNL throughout R LE, hip abduction and extension strength were 3+/4- today with pt obviously working hard on HEP. Mod tenderness initially over R ITB which improved throughout STM. Pt is showing good progress towards goals and should be ready for dc in the next week or two      Prognosis: [x] Good [] Fair  [] Poor    Patient Requires Follow-up: [x] Yes  [] No    Goals:  Short term goals  Time Frame for Short term goals: In 3 weeks pt will demonstrate  Short term goal 1: Increase R hip flex, IR strength 4/5  Short term goal 2: Increase R hip abd strength 3+/5- met 11/23  Short term goal 3: Increase R hip flex ROM >100 degrees- met 11/23  Short term goal 4: Increase R hip IR ROM >30 degrees- met 11/16  Short term goal 5: Demonstrate knowledge and understanding of HEP  Long term goals  Time Frame for Long term goals : In 6 weeks pt will demonstrate  Long term goal 1: Increase R hip flex, IR strength 5/5  Long term goal 2: Increase R hip abd strength 4+/5  Long term goal 3: Increase R hip flex ROM >120 degrees- met 11/23  Long term goal 4: Increase R hip IR ROM >40 degrees- met 11/23  Long term goal 5:  Increase LEFS score >75/80    Plan:   Visits per week:  2  # of weeks:  6    [x] Continue per plan of care [] Alter current plan (see comments)  [] Plan of care initiated [] Hold pending MD visit [] Discharge    Plan for Next Session:  Progress hip strength, manual ITB stretch/STM    Electronically signed by:  Lucnia Villarreal PT

## 2020-11-24 ENCOUNTER — TELEPHONE (OUTPATIENT)
Dept: ADMINISTRATIVE | Age: 35
End: 2020-11-24

## 2020-11-24 NOTE — TELEPHONE ENCOUNTER
[address] [facility] requesting VACC.  Records be FAXED to: [phone]    Requested by: Monika Churchill

## 2020-11-25 ENCOUNTER — APPOINTMENT (OUTPATIENT)
Dept: PHYSICAL THERAPY | Age: 35
End: 2020-11-25
Payer: COMMERCIAL

## 2020-11-27 ENCOUNTER — HOSPITAL ENCOUNTER (OUTPATIENT)
Dept: PHYSICAL THERAPY | Age: 35
Setting detail: THERAPIES SERIES
Discharge: HOME | End: 2020-11-27
Payer: COMMERCIAL

## 2020-11-27 NOTE — CARE COORDINATION
Physical Therapy  Cancellation/No-show Note  Patient Name:  Alicja Perales  :  1985   Date:  2020  MRN: [id number]  Cancelled visits to date: 0  No-shows to date: 0    For today's appointment patient:  []  Cancelled  []  Rescheduled appointment  [x]  No-show     Reason given by patient:  []  Patient ill  []  Conflicting appointment  []  No transportation    []  Conflict with work  []  No reason given  []  Other:     Comments:      Electronically signed by:  Dewain Apley, PT

## 2020-11-30 ENCOUNTER — HOSPITAL ENCOUNTER (OUTPATIENT)
Dept: PHYSICAL THERAPY | Age: 35
Setting detail: THERAPIES SERIES
Discharge: HOME | End: 2020-11-30
Payer: COMMERCIAL

## 2020-11-30 NOTE — CARE COORDINATION
Physical Therapy  Cancellation/No-show Note  Patient Name:  Guille Finn  :  1985   Date:  2020  MRN: [id number]  Cancelled visits to date: 0  No-shows to date: 0    For today's appointment patient:  [x]  Cancelled  []  Rescheduled appointment  []  No-show     Reason given by patient:  [x]  Patient ill  []  Conflicting appointment  []  No transportation    []  Conflict with work  []  No reason given  []  Other:     Comments:      Electronically signed by:  Caryle Nicely, PT

## 2020-12-07 ENCOUNTER — HOSPITAL ENCOUNTER (OUTPATIENT)
Dept: PHYSICAL THERAPY | Age: 35
Setting detail: THERAPIES SERIES
Discharge: HOME | End: 2020-12-07
Payer: COMMERCIAL

## 2020-12-07 PROCEDURE — 97110 THERAPEUTIC EXERCISES: CPT

## 2020-12-07 PROCEDURE — 97140 MANUAL THERAPY 1/> REGIONS: CPT

## 2020-12-07 NOTE — FLOWSHEET NOTE
Physical Therapy Daily Treatment Note  Date:  2020    Patient Name:  Gt Oneill    :  1985  MRN: [id number]  Restrictions/Precautions:        Medical/Treatment Diagnosis Information:  · Diagnosis: M25.551 (ICD-10-CM) - Right hip pain  · Treatment Diagnosis: R hip decreased ROM and strength  Insurance/Certification information:     Insurance Allowable Visits:    Physician Information:  Referring Practitioner: Cheryl Babcock MD MD Follow-up Visit:   Plan of care signed (Y/N):  Sent to inbox   Visit# / total visits:    Pain level: 0-1/10     Progress Note Due (10 visits or 30 days, whichever is less):    Recertification Note Due (End of POC or 90 days, whichever is less):  20    Subjective:  Pt reports increased pain in R hip after standing all day at work, pain rated 2-3/10      Pain Screening  Patient Currently in Pain: Yes  Pain Assessment  Pain Assessment: 0-10  Pain Level: 0-1    Objective:   Observation: mild palpable tenderness R IT band, demonstrating normal gt sequence   Test measurements:      10/30/20   R hip flex: WNL(I knee to chest)    R hip IR: 45 degrees    R hip ER: 45 degrees    R hip flex strength: 4/5   R hip ER strength: 4+/5   R hip IR strength: 3+/5   R hip abd strength: 3+/5    Exercises, Neuro Facilitation & Gait Training [id number] [id number], [id number], [id number]):   Activity Resistance/Repetitions Other comments   IT band stretch sidelying  3 x 30\" Cueing for technique    Piriformis stretch IR/ER 3 x 30\" Cueing for technique         Bridges + ER w/green tband X 15    Prone bent knee lift 10    sidelying abduction 15    Side stepping 30-40'         R clamshell 20, green tband    B abduction in standing Green tband  x15                          Therapeutic Activities (07008):      Home Exercise Program:  Progressed glut strengthening to bridging with knee fall outs using green tband    Manual Treatments (30894):  ITB STM/passive stretching in

## 2020-12-09 ENCOUNTER — HOSPITAL ENCOUNTER (OUTPATIENT)
Dept: PHYSICAL THERAPY | Age: 35
Setting detail: THERAPIES SERIES
Discharge: HOME | End: 2020-12-09
Payer: COMMERCIAL

## 2020-12-09 PROCEDURE — 97110 THERAPEUTIC EXERCISES: CPT

## 2020-12-09 PROCEDURE — 97140 MANUAL THERAPY 1/> REGIONS: CPT

## 2020-12-09 NOTE — FLOWSHEET NOTE
Physical Therapy Daily Treatment Note  Date:  2020    Patient Name:  Alicja Perales    :  1985  MRN: [id number]  Restrictions/Precautions:        Medical/Treatment Diagnosis Information:  · Diagnosis: M25.551 (ICD-10-CM) - Right hip pain  · Treatment Diagnosis: R hip decreased ROM and strength  Insurance/Certification information:     Insurance Allowable Visits:    Physician Information:  Referring Practitioner: Devon Espinoza MD MD Follow-up Visit:   Plan of care signed (Y/N):  Sent to inbox   Visit# / total visits:    Pain level: 0/10     Progress Note Due (10 visits or 30 days, whichever is less):    Recertification Note Due (End of POC or 90 days, whichever is less):  20    Subjective:  Feeling good, denies pain since last seen      Pain Screening  Patient Currently in Pain: Yes  Pain Assessment  Pain Assessment: 0-10  Pain Level: 0-1    Objective:   Observation: mild palpable tenderness R IT band, demonstrating normal gt sequence   Test measurements:      10/30/20   R hip flex: WNL(I knee to chest)    R hip IR: 45 degrees    R hip ER: 45 degrees    R hip flex strength: 4/5   R hip ER strength: 4+/5   R hip IR strength: 3+/5   R hip abd strength: 3+/5    Exercises, Neuro Facilitation & Gait Training [phone], [id number], [id number]): Activity Resistance/Repetitions Other comments   IT band stretch sidelying  3 x 30\" Cueing for technique    Piriformis stretch IR/ER 3 x 30\" Cueing for technique    Prone straight leg ext 20    Bridges + ER w/green tband X 20    Prone bent knee lift 2x 10    sidelying abduction 2x 15    Side stepping 150'         R clamshell 25, green tband    B abduction in standing Green tband  x15    Lateral step ups 20                     Therapeutic Activities (29023):   Instructed in proper components and wt shift with sit to stand transfers    Home Exercise Program:  Added lateral step ups and progressed reps    Manual Treatments (21926):  STM R IT band Modalities:  US R hip x 7 min continuous 1.9wcm2    Timed Code Treatment Minutes:   MT x 11, TE x 40    Total Treatment Minutes:  58 minutes    Treatment/Activity Tolerance:  [x] Patient tolerated treatment well [] Patient limited by fatigue  [] Patient limited by pain  [] Patient limited by other medical complications  [] Other:     Assessment: Pt demonstrating improved strength throughout R hip abductors and extensors with less difficulty and muscle fatigue observed with increased reps      Prognosis: [x] Good [] Fair  [] Poor    Patient Requires Follow-up: [x] Yes  [] No    Goals:  Short term goals  Time Frame for Short term goals: In 3 weeks pt will demonstrate  Short term goal 1: Increase R hip flex, IR strength 4/5  Short term goal 2: Increase R hip abd strength 3+/5- met 11/23  Short term goal 3: Increase R hip flex ROM >100 degrees- met 11/23  Short term goal 4: Increase R hip IR ROM >30 degrees- met 11/16  Short term goal 5: Demonstrate knowledge and understanding of HEP  Long term goals  Time Frame for Long term goals : In 6 weeks pt will demonstrate  Long term goal 1: Increase R hip flex, IR strength 5/5  Long term goal 2: Increase R hip abd strength 4+/5  Long term goal 3: Increase R hip flex ROM >120 degrees- met 11/23  Long term goal 4: Increase R hip IR ROM >40 degrees- met 11/23  Long term goal 5:  Increase LEFS score >75/80    Plan:   Visits per week:  2  # of weeks:  6    [x] Continue per plan of care [] Alter current plan (see comments)  [] Plan of care initiated [] Hold pending MD visit [] Discharge    Plan for Next Session:  Progress hip strength, manual ITB stretch/STM    Electronically signed by:  Ferny Scott  PT

## 2020-12-10 NOTE — PROGRESS NOTES
ITB and piriformis  · Pain decreased to 0-1/10      Progress towards goals:   Pt demonstrating improved strength throughout R hip abductors and extensors with less difficulty and muscle fatigue observed with increased reps. Flexibility improving and pt is demonstrating I HEP. Pt has been seen 6/12 visits per original POC with extension required to meet established goals      Frequency/Duration:  # Days per week: [] 1 day # Weeks: [] 1 week [] 4 weeks      [x] 2 days? [] 2 weeks [] 5 weeks      [] 3 days   [] 3 weeks [x] 6 weeks     Rehab Potential: [] Excellent [x] Good [] Fair  [] Poor     Goal Status:  [] Achieved [x] Partially Achieved  [] Not Achieved     Patient Status: [x] Continue per initial plan of Care     [] Patient now discharged     [] Additional visits requested, Please re-certify for additional visits:      Requested frequency/duration: 2 X/week for 4 weeks    Electronically signed by:  Kimberlyn Dowling PT    If you have any questions or concerns, please don't hesitate to call.   Thank you for your referral.    Physician Signature:________________________________Date:__________________  By signing above, therapists plan is approved by physician

## 2020-12-14 ENCOUNTER — HOSPITAL ENCOUNTER (OUTPATIENT)
Dept: PHYSICAL THERAPY | Age: 35
Setting detail: THERAPIES SERIES
Discharge: HOME | End: 2020-12-14
Payer: COMMERCIAL

## 2020-12-14 NOTE — CARE COORDINATION
Physical Therapy  Cancellation/No-show Note  Patient Name:  Martin Meyers  :  1985   Date:  2020  MRN: [id number]  Cancelled visits to date: 0  No-shows to date: 0    For today's appointment patient:  [x]  Cancelled  []  Rescheduled appointment  []  No-show     Reason given by patient:  []  Patient ill  []  Conflicting appointment  []  No transportation    [x]  Conflict with work  []  No reason given  []  Other:     Comments:      Electronically signed by:  Mariah Escobar, PT

## 2021-07-27 RX ORDER — ETONOGESTREL 68 MG/1
IMPLANT SUBCUTANEOUS
COMMUNITY
Start: 2020-06-20

## 2021-07-27 RX ORDER — MEDROXYPROGESTERONE ACETATE 150 MG/ML
INJECTION, SUSPENSION INTRAMUSCULAR
COMMUNITY
End: 2021-07-28

## 2021-07-27 RX ORDER — PRENATAL VIT 27,CALC/IRON/FA 60 MG-1 MG
1 TABLET ORAL DAILY
COMMUNITY
End: 2021-07-28

## 2021-07-28 ENCOUNTER — OFFICE VISIT (OUTPATIENT)
Dept: UROGYNECOLOGY | Age: 36
End: 2021-07-28
Payer: MEDICAID

## 2021-07-28 VITALS
DIASTOLIC BLOOD PRESSURE: 77 MMHG | OXYGEN SATURATION: 98 % | HEART RATE: 76 BPM | SYSTOLIC BLOOD PRESSURE: 121 MMHG | TEMPERATURE: 98 F | RESPIRATION RATE: 14 BRPM

## 2021-07-28 DIAGNOSIS — R39.15 URINARY URGENCY: ICD-10-CM

## 2021-07-28 DIAGNOSIS — R35.0 URINARY FREQUENCY: ICD-10-CM

## 2021-07-28 DIAGNOSIS — N39.3 STRESS INCONTINENCE, FEMALE: Primary | ICD-10-CM

## 2021-07-28 LAB
BILIRUBIN, POC: NORMAL
BLOOD URINE, POC: NORMAL
CLARITY, POC: CLEAR
COLOR, POC: YELLOW
EMPTY COUGH STRESS TEST: POSITIVE
FIRST SENSATION: NORMAL
FULL COUGH STRESS TEST: NORMAL
GLUCOSE URINE, POC: NORMAL
KETONES, POC: NORMAL
LEUKOCYTE EST, POC: NORMAL
MAX SENSATION: NORMAL
NITRATE, URINE POC: NORMAL
NITRITE, POC: NORMAL
PH, POC: 6.5
POST VOID RESIDUAL (PVR): 20 ML
PROTEIN, POC: NORMAL
RBC URINE, POC: NORMAL
SECOND SENSATION: NORMAL
SPASM: NORMAL
SPECIFIC GRAVITY, POC: 1.01
UROBILINOGEN, POC: NORMAL
WBC URINE, POC: NORMAL

## 2021-07-28 PROCEDURE — 81002 URINALYSIS NONAUTO W/O SCOPE: CPT | Performed by: OBSTETRICS & GYNECOLOGY

## 2021-07-28 PROCEDURE — G8427 DOCREV CUR MEDS BY ELIG CLIN: HCPCS | Performed by: OBSTETRICS & GYNECOLOGY

## 2021-07-28 PROCEDURE — 51725 SIMPLE CYSTOMETROGRAM: CPT | Performed by: OBSTETRICS & GYNECOLOGY

## 2021-07-28 PROCEDURE — G8419 CALC BMI OUT NRM PARAM NOF/U: HCPCS | Performed by: OBSTETRICS & GYNECOLOGY

## 2021-07-28 PROCEDURE — 99243 OFF/OP CNSLTJ NEW/EST LOW 30: CPT | Performed by: OBSTETRICS & GYNECOLOGY

## 2021-07-28 RX ORDER — OXYBUTYNIN CHLORIDE 10 MG/1
10 TABLET, EXTENDED RELEASE ORAL DAILY
Qty: 30 TABLET | Refills: 0 | Status: SHIPPED | OUTPATIENT
Start: 2021-07-28 | End: 2021-08-19

## 2021-07-28 RX ORDER — MEDROXYPROGESTERONE ACETATE 150 MG/ML
INJECTION, SUSPENSION INTRAMUSCULAR
COMMUNITY
End: 2021-07-28

## 2021-07-28 NOTE — PROGRESS NOTES
2021      HPI:     Name: Danna Hutchins  YOB: 1985    CC: Patient is a 38 y.o. female who is seen in consultation from Dr Valentine Freire   for evaluation of stress incontinence . HPI:  Bladder control problem: yes, for the past year she reports losing urine when she coughs, sneezes, laughs, changes in positions, and she also always feels wet. She does report some urgency when she is helping her young children go to the bathroom. She is not able to hold her urine. She wears approx 5 pads per day. Bladder emptying problems: no  Prolapse/Vaginal Support problems: no  Bowel problem(s): no  Sexual History:  reports being sexually active and has had partner(s) who are Male.   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   4 Term 18 41w0d   F Vag-Spont EPI  ELADIO   3 Term 17 40w0d   M Vag-Spont EPI N ELADIO   2 Term 14    Selena Bad Vag-Spont   ELADIO   1 SAB               Birth Comments:      Past Medical History:   Past Medical History:   Diagnosis Date    Anemia 2013    Irregular menses 2013    Postpartum hypertension 2018    Stress incontinence, female 2021     Past Surgical History:   Past Surgical History:   Procedure Laterality Date    BREAST SURGERY       Allergies: No Known Allergies  Current Medications:  Current Outpatient Medications   Medication Sig Dispense Refill    oxybutynin (DITROPAN-XL) 10 MG extended release tablet Take 1 tablet by mouth daily 30 tablet 0    etonogestrel (NEXPLANON) 68 MG implant implant 1  by subdermal route      ibuprofen (IBU) 600 MG tablet Take 1 tablet by mouth every 6 hours as needed for Pain (Patient not taking: Reported on 7/28/2021) 120 tablet 0    acetaminophen (TYLENOL) 325 MG tablet Take 1,000 mg by mouth once (Patient not taking: Reported on 7/28/2021)       No current facility-administered medications for this visit. Social History:   Social History     Socioeconomic History    Marital status:      Spouse name: Not on file    Number of children: Not on file    Years of education: Not on file    Highest education level: Not on file   Occupational History    Not on file   Tobacco Use    Smoking status: Never Smoker    Smokeless tobacco: Never Used   Vaping Use    Vaping Use: Never used   Substance and Sexual Activity    Alcohol use: No    Drug use: No    Sexual activity: Yes     Partners: Male   Other Topics Concern    Not on file   Social History Narrative    Not on file     Social Determinants of Health     Financial Resource Strain:     Difficulty of Paying Living Expenses:    Food Insecurity:     Worried About Running Out of Food in the Last Year:     [address] in the Last Year:    Transportation Needs:     Lack of Transportation (Medical):      Lack of Transportation (Non-Medical):    Physical Activity:     Days of Exercise per Week:     Minutes of Exercise per Session:    Stress:     Feeling of Stress :    Social Connections:     Frequency of Communication with Friends and Family:     Frequency of Social Gatherings with Friends and Family:     Attends Confucianism Services:     Active Member of Clubs or Organizations:     Attends Club or Organization Meetings:     Marital Status:    Intimate Partner Violence:     Fear of Current or Ex-Partner:     Emotionally Abused:     Physically Abused:     Sexually Abused:      Family History:   Family History   Problem Relation Age of Onset    Hypertension Father     Asthma Mother    Alyne Campuzano / Stillbirths Mother     Breast Cancer Paternal Grandmother     Rheum Arthritis Neg Hx     Osteoarthritis Neg Hx     Cancer Neg Hx     0 - 2 /HPF Final     rbc urine, poc   Date Value Ref Range Status   07/28/2021 neg  Final     Nitrate, UA POC   Date Value Ref Range Status   07/28/2021 neg  Final     post void residual   Date Value Ref Range Status   07/28/2021 20 ml Final     EMPTY COUGH STRESS TEST   Date Value Ref Range Status   07/28/2021 positive  Final     FULL COUGH STRESS TEST   Date Value Ref Range Status   07/28/2021 not assessed  Final     SPASM   Date Value Ref Range Status   07/28/2021   Final    positive-large spasm at 20ml. Pushed catheter out, and all contents        POPQ and Pelvic Exam:     Pelvic Organ Prolapse Quantification  Anterior Wall (Aa): -2   Anterior Wall (Ba): -2   Cervix or Cuff (C): -5     Genital Hiatus (gh): 3   Perineal Body (pb): 4   Total Vaginal Length (tvl): 10     Posterior Wall (Ap): -2   Posterior Wall (Bp): -2   Posterior Fornix (D): -9     Oxford Scale Muscle Strength: 2/5       Assessment/Plan:     Danna Hutchins is a 38 y.o. female with   1. Stress incontinence, female    2. Urinary urgency    3. Urinary frequency    By her history she describes almost entirely stress urinary incontinence. However on her office filling study today she had a bladder spasm and a very low bladder capacity of 20 cc. I think this is probably false. She has agreed to return for a cystourethroscopy and repeat test as well as try Ditropan until she comes back. She was given handouts on both stress incontinence and overactive bladder.     Orders Placed This Encounter   Procedures    Insert noriega catheter    POCT Urinalysis no Micro    Cystometrogram     Orders Placed This Encounter   Medications    oxybutynin (DITROPAN-XL) 10 MG extended release tablet     Sig: Take 1 tablet by mouth daily     Dispense:  30 tablet     Refill:  0       Hayley Jiang MD

## 2021-09-01 ENCOUNTER — PROCEDURE VISIT (OUTPATIENT)
Dept: UROGYNECOLOGY | Age: 36
End: 2021-09-01
Payer: COMMERCIAL

## 2021-09-01 VITALS
HEART RATE: 76 BPM | TEMPERATURE: 97.5 F | OXYGEN SATURATION: 98 % | SYSTOLIC BLOOD PRESSURE: 139 MMHG | RESPIRATION RATE: 16 BRPM | DIASTOLIC BLOOD PRESSURE: 92 MMHG

## 2021-09-01 DIAGNOSIS — N39.3 STRESS INCONTINENCE, FEMALE: Primary | ICD-10-CM

## 2021-09-01 DIAGNOSIS — R39.15 URINARY URGENCY: ICD-10-CM

## 2021-09-01 DIAGNOSIS — R35.0 URINARY FREQUENCY: ICD-10-CM

## 2021-09-01 DIAGNOSIS — Z01.818 PREOP TESTING: ICD-10-CM

## 2021-09-01 DIAGNOSIS — N39.41 URGE INCONTINENCE: ICD-10-CM

## 2021-09-01 PROCEDURE — G8419 CALC BMI OUT NRM PARAM NOF/U: HCPCS | Performed by: OBSTETRICS & GYNECOLOGY

## 2021-09-01 PROCEDURE — 52285 CYSTOSCOPY AND TREATMENT: CPT | Performed by: OBSTETRICS & GYNECOLOGY

## 2021-09-01 PROCEDURE — G8427 DOCREV CUR MEDS BY ELIG CLIN: HCPCS | Performed by: OBSTETRICS & GYNECOLOGY

## 2021-09-01 PROCEDURE — 1036F TOBACCO NON-USER: CPT | Performed by: OBSTETRICS & GYNECOLOGY

## 2021-09-01 PROCEDURE — 99214 OFFICE O/P EST MOD 30 MIN: CPT | Performed by: OBSTETRICS & GYNECOLOGY

## 2021-09-01 RX ORDER — SODIUM CHLORIDE 9 MG/ML
25 INJECTION, SOLUTION INTRAVENOUS PRN
Status: CANCELLED | OUTPATIENT
Start: 2021-09-01

## 2021-09-01 RX ORDER — SODIUM CHLORIDE 0.9 % (FLUSH) 0.9 %
5-40 SYRINGE (ML) INJECTION PRN
Status: CANCELLED | OUTPATIENT
Start: 2021-09-01

## 2021-09-01 RX ORDER — SODIUM CHLORIDE 0.9 % (FLUSH) 0.9 %
5-40 SYRINGE (ML) INJECTION EVERY 12 HOURS SCHEDULED
Status: CANCELLED | OUTPATIENT
Start: 2021-09-01

## 2021-09-01 NOTE — PROGRESS NOTES
2021     HPI:     Name: Virginie Bahena  YOB: 1985    CC: Virginie Bahena is a 38 y.o. female presenting for an evaluation of stress incontinence . HPI: How long have you had this problem? 2 years ago  Please rate the severity of your problem: severe  Anything make it better? 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   4 Term 18 41w0d   F Vag-Spont EPI  ELADIO   3 Term 17 40w0d   M Vag-Spont EPI N ELADIO   2 Term 14    Cristóbal Ion Vag-Spont   ELADIO   1 SAB 2012              Birth Comments:      Past Medical History:   Past Medical History:   Diagnosis Date    Anemia 2013    Irregular menses 2013    Postpartum hypertension 2018    Stress incontinence, female 2021     Past Surgical History:   Past Surgical History:   Procedure Laterality Date    BREAST SURGERY       Current Medications:  Current Outpatient Medications   Medication Sig Dispense Refill    etonogestrel (NEXPLANON) 68 MG implant implant 1  by subdermal route      ibuprofen (IBU) 600 MG tablet Take 1 tablet by mouth every 6 hours as needed for Pain 120 tablet 0    acetaminophen (TYLENOL) 325 MG tablet Take 1,000 mg by mouth once        No current facility-administered medications for this visit.      Allergies: No Known Allergies  Social History:   Social History     Socioeconomic History    Marital status:      Spouse name: Not on file    Number of children: Not on file    Years of education: Not on file    Highest education level: Not on file   Occupational History    Not on file   Tobacco Use    Smoking status: Never Smoker    Smokeless tobacco: Never Used   Vaping Use    Vaping Use: Never used Substance and Sexual Activity    Alcohol use: No    Drug use: No    Sexual activity: Yes     Partners: Male   Other Topics Concern    Not on file   Social History Narrative    Not on file     Social Determinants of Health     Financial Resource Strain:     Difficulty of Paying Living Expenses:    Food Insecurity:     Worried About Running Out of Food in the Last Year:     [address] in the Last Year:    Transportation Needs:     Lack of Transportation (Medical):  Lack of Transportation (Non-Medical):    Physical Activity:     Days of Exercise per Week:     Minutes of Exercise per Session:    Stress:     Feeling of Stress :    Social Connections:     Frequency of Communication with Friends and Family:     Frequency of Social Gatherings with Friends and Family:     Attends Sikh Services:     Active Member of Clubs or Organizations:     Attends Club or Organization Meetings:     Marital Status:    Intimate Partner Violence:     Fear of Current or Ex-Partner:     Emotionally Abused:     Physically Abused:     Sexually Abused:      Family History:   Family History   Problem Relation Age of Onset    Hypertension Father     Asthma Mother     Vianey Cables / Stillbirths Mother     Breast Cancer Paternal Grandmother     Rheum Arthritis Neg Hx     Osteoarthritis Neg Hx     Cancer Neg Hx     Diabetes Neg Hx     Heart Failure Neg Hx     High Cholesterol Neg Hx     Migraines Neg Hx     Ovarian Cancer Neg Hx     Rashes/Skin Problems Neg Hx     Seizures Neg Hx     Stroke Neg Hx     Thyroid Disease Neg Hx      Review of System  Review of Systems   Genitourinary: Positive for enuresis. All other systems reviewed and are negative. A review of systems was done by the patient and reviewed by me and scanned into media today.     Objective:     Vital Signs  Vitals:    09/01/21 0929   BP: (!) 139/92   Pulse: 76   Resp: 16   Temp: 97.5 °F (36.4 °C)   SpO2: 98%      Physical Exam  Physical Exam  HENT:      Head: Normocephalic and atraumatic. Eyes:      Conjunctiva/sclera: Conjunctivae normal.   Pulmonary:      Effort: Pulmonary effort is normal.   Abdominal:      Palpations: Abdomen is soft. Musculoskeletal:      Cervical back: Normal range of motion and neck supple. Skin:     General: Skin is warm and dry. Neurological:      Mental Status: She is alert and oriented to person, place, and time. Procedure: The urethral was anesthesized with topical lidocaine jelly and dilated to a #14 [place]. A 0-degree urethroscope was used to examine the urethra. A 70-degree cystoscope was used to evaluate the bladder. FINDINGS:  1. Urethra was normal  2. Bladder had normal  3. Trigone appeared Normal  4. Ureters illustrated bilateral efflux  5. Patient exhibited spasmsd uring the study no    Cough stress test: Bladder filled to 150 mL. Patient did leak with cough stress test.  She also had an empty cough stress test.  No results found for this visit on 09/01/21. Assessment/Plan:     Vijay Armando is a 38 y.o. female with   1. Stress incontinence, female    2. Preop testing    3. Urinary urgency    4. Urinary frequency    5. Urge incontinence    She tried the anticholinergic medication with no improvement for the urge incontinence. She did have a large bladder spasm at her office filling study last time however I think this may have been erroneous. She did leak again today with an empty cough stress test.  We talked about a suburethral sling and I did consent her for the surgery today. Records were reviewed. The patient was counseled on surgical and non-surgical options. The patient elected to move forward with surgery. The risks and benefits of surgery including but not limited to bleeding, infection, injury to bowel, bladder, ureter, or other internal organs, transfusion, pain, bowel dysfunction, urinary incontinence, and sexual dysfunction were discussed at length. All questions were answered to the patients satisfaction. The patient elected to undergo synthetic mid-urethral sling and cystourethroscopy. The risk and benefits of synthetic material, including mesh, were explained to the patient and all questions were answered. Preop labs were ordered  Orders Placed This Encounter   Procedures    COVID-19     PreOp Surgery COVID Screening. Standing Status:   Future     Standing Expiration Date:   9/1/2022     Scheduling Instructions:      1) Due to current limited availability of the COVID-19 test, tests will be prioritized based on responses to questions above. Testing may be delayed due to volume. 2) Print and instruct patient to adhere to CDC home isolation program. (Link Above)              3) Set up or refer patient for a monitoring program.              4) Have patient sign up for and leverage MyChart (if not previously done). Order Specific Question:   Is this test for diagnosis or screening? Answer:   Screening     Order Specific Question:   Symptomatic for COVID-19 as defined by CDC? Answer:   No     Order Specific Question:   Date of Symptom Onset     Answer:   N/A     Order Specific Question:   Hospitalized for COVID-19? Answer:   No     Order Specific Question:   Admitted to ICU for COVID-19? Answer:   No     Order Specific Question:   Employed in healthcare setting? Answer:   Unknown     Order Specific Question:   Resident in a congregate (group) care setting? Answer:   Unknown     Order Specific Question:   Pregnant? Answer:   No     Order Specific Question:   Previously tested for COVID-19? Answer:   Unknown    Initiate PAT Protocol     Standing Status:   Future     Standing Expiration Date:   10/31/2021    SD CYSTOSCOPY,RX FEMALE URETHRAL SYND       No orders of the defined types were placed in this encounter.       Celestina Brown MD

## 2021-09-01 NOTE — LETTER
[address] [place] [address] Urogynecology  [facility] [address] [facility]  Phone: [phone]  Fax: [phone]           Pascual Azul MD      September 1, 2021     Patient: Norris Giron   MR Number: [id number]   YOB: 1985   Date of Visit: 9/1/2021       Dear Dr. Alberto Otto: Thank you for referring Norris Giron to me for evaluation/treatment. Below are the relevant portions of my assessment and plan of care. If you have questions, please do not hesitate to call me. I look forward to following Taila along with you.     Sincerely,        Pascual Azul MD    CC providers:  Olivia Bermudez MD  [address] [place] [address] [facility]  [facility] [place]

## 2021-09-01 NOTE — LETTER
Ivelisse Apple MD     [address] [place] [address], [address] [place] [address]   Urogynecology               [address]                              [address] [facility], M[address] Rice [address]          Soco Victor)[phone]   [phone]               Instructions for Outpatient Surgery  09/01/21        Patient Name: [facility]:    [facility] 52 [address]    Date of Surgery 9/9/21  Tentative Arrival Time  8:30am  at the Main Entrance. Post Op Appointment  Date  9/23/21  with Eugenio Rush NP   Time  9:45am  at  [address] [place], [place] [address]M[address]  Medical Office. PLEASE BE CERTAIN TO FOLLOW THESE INSTRUCTIONS CAREFULLY BEFORE SURGERY  1. x     Do NOT eat or drink anything after midnight the evening before surgery. Food or drink intake   of any kind will result in the cancellation of surgery. 2. x     A pre-operative exam MUST be performed by either your primary medical doctor or an Urgent  Care. This must be within 30 days prior to your surgery date. 3. x     STOP all Blood Thinning medications including Aspirin, Anti inflammatories, Vitamins, and Herbal Medications ; 7 days prior to surgery. 4. x     COVID TESTING DONE PRIOR TO SURGERY AND SELF QUARANTINE. AFTER SURGERY  1. A responsible adult is REQUIRED to accompany you to the hospital and remain there for your surgery. If this arrangement has not been made at the time of your surgery, your surgery may be cancelled  2. You should not be left alone for 24  48 hours following surgery. 3. You should not drive, sign any important documents or make any important decisions until you have fully recovered from anesthesia (approximately 24  48 hours) AND are off all narcotic pain medications.   · Any paperwork needing completion for either your employer or insurance company can be faxed, mailed or dropped off at our office to my attention. Please allow 7 business days for the paperwork to be completed. You will be contacted once it has been completed at which time you will be able to pick it up or have it mailed. Fees for form completion may apply. If you have any questions, please feel free to call the number above. MD Brandee Reeves MD     [address] [place] [address]   Urogynecology               [address] [place] [address] Jorge Rice [address]          [facility]    A)[phone]   P)[phone]       Surgery After Care Check List    9/1/2021    Jocelin Zhang  1985    No bowel Prep prior to Surgery. START medication once you get home from Surgery.  Ibuprofen 600mg Three times a day for 2 weeks for PAIN and inflammation.  You may not have a bowel movement for 3-4 days after surgery, this is NORMAL.  It is very NORMAL to go home with a Tanner catheter in your bladder after surgery.  If you have a Tanner catheter in place, You will have a nurse office visit 1 week after surgery. This will determine if the catheter can be removed or if it will have to remain for another week.  If you have a Tanner catheter, it is NORMAL to have urine leak around the catheter at times. Brandee Camarena MD     [address] [place] [address]   Urogynecology               [address] [place] [address] Jorge Rice [address] [place] [address]    [address]  Updated beginning 9/6/21      Covid testing or Rapid Covid testing must be done 3-6 days prior to your Surgery, regardless of vaccination status. Prescription enclosed for [address] [facility] [address] -Any covid testing needs for surgeries or procedures at [facility]- will be done in the [place] in the hospital. The hours of operation for walk in testing for preops will be as follows:                                           Mondays        8:00am to 11:30am                                         Wednesdays  8:00am to 11:30am                                         Fridays           8:00am to 11:30am      [facility]     [address] [facility],  [place], [address] [facility] fxkmmrgb-Pssclvusgikz-Ppxsczzwbx Lab. The hours of operation for walk in testing for preops will be as follows:                                                 Monday-Friday 7:00am-4:30pm      [address]: [address] [facility] [address] [place] [address] phone [phone]; Fax [phone]  --  [id number]. No appointment needed  walk-ins welcome.  Your Primary Care Doctor, [address] [place] [address] Dr, Urgent Care, [facility], [facility], ect. ... Angel Yap MD     [address] Urogynecology               [address]                                              [address] [facility],  [address]    O)[phone]   S)[phone]       *If you use below testing sites-results must be faxed to [phone] 3 days prior to Surgery. *     Community Testing Options:  (additional options in links below)      Search By Location  [email or web address]     Search By Location - [email or web address]   Search By location - https://coronavirus. [place].gov/wps/portal/gov/covid-19/dashboards/other-resources/sqdclwf-tm-cqogahk   [address]: http://health.[facility]. /schedule_for_a_vaccine_clinic/index. ph  [facility] Only: [email or web address]/[email or web address]-testing-locations/   [facility] Only:  [email or web address] [email or web address]

## 2021-09-02 ENCOUNTER — OFFICE VISIT (OUTPATIENT)
Dept: INTERNAL MEDICINE CLINIC | Age: 36
End: 2021-09-02
Payer: COMMERCIAL

## 2021-09-02 VITALS
SYSTOLIC BLOOD PRESSURE: 116 MMHG | BODY MASS INDEX: 28.99 KG/M2 | WEIGHT: 174 LBS | OXYGEN SATURATION: 99 % | DIASTOLIC BLOOD PRESSURE: 79 MMHG | HEIGHT: 65 IN | HEART RATE: 77 BPM

## 2021-09-02 DIAGNOSIS — Z01.818 PREOP EXAMINATION: Primary | ICD-10-CM

## 2021-09-02 PROCEDURE — G8427 DOCREV CUR MEDS BY ELIG CLIN: HCPCS | Performed by: NURSE PRACTITIONER

## 2021-09-02 PROCEDURE — 99213 OFFICE O/P EST LOW 20 MIN: CPT | Performed by: NURSE PRACTITIONER

## 2021-09-02 PROCEDURE — G8419 CALC BMI OUT NRM PARAM NOF/U: HCPCS | Performed by: NURSE PRACTITIONER

## 2021-09-02 PROCEDURE — 1036F TOBACCO NON-USER: CPT | Performed by: NURSE PRACTITIONER

## 2021-09-02 SDOH — ECONOMIC STABILITY: FOOD INSECURITY: WITHIN THE PAST 12 MONTHS, THE FOOD YOU BOUGHT JUST DIDN'T LAST AND YOU DIDN'T HAVE MONEY TO GET MORE.: NEVER TRUE

## 2021-09-02 SDOH — ECONOMIC STABILITY: FOOD INSECURITY: WITHIN THE PAST 12 MONTHS, YOU WORRIED THAT YOUR FOOD WOULD RUN OUT BEFORE YOU GOT MONEY TO BUY MORE.: NEVER TRUE

## 2021-09-02 ASSESSMENT — PATIENT HEALTH QUESTIONNAIRE - PHQ9
2. FEELING DOWN, DEPRESSED OR HOPELESS: 0
SUM OF ALL RESPONSES TO PHQ QUESTIONS 1-9: 0
SUM OF ALL RESPONSES TO PHQ QUESTIONS 1-9: 0
SUM OF ALL RESPONSES TO PHQ9 QUESTIONS 1 & 2: 0
1. LITTLE INTEREST OR PLEASURE IN DOING THINGS: 0
SUM OF ALL RESPONSES TO PHQ QUESTIONS 1-9: 0

## 2021-09-02 ASSESSMENT — SOCIAL DETERMINANTS OF HEALTH (SDOH): HOW HARD IS IT FOR YOU TO PAY FOR THE VERY BASICS LIKE FOOD, HOUSING, MEDICAL CARE, AND HEATING?: NOT HARD AT ALL

## 2021-09-02 NOTE — PROGRESS NOTES
Preoperative Consultation      Alicja Perales  YOB: 1985    Date of Service:  2021    Vitals:    21 0910   BP: 116/79   Site: Left Upper Arm   Position: Sitting   Cuff Size: Medium Adult   Pulse: 77   SpO2: 99%   Weight: 174 lb (78.9 kg)   Height: 5' 5\" (1.651 m)      Wt Readings from Last 2 Encounters:   21 174 lb (78.9 kg)   20 175 lb 12.8 oz (79.7 kg)     BP Readings from Last 3 Encounters:   21 116/79   21 (!) 139/92   21 121/77        Chief Complaint   Patient presents with    Pre-op Exam     21 physical and blood work needed      No Known Allergies  Outpatient Medications Marked as Taking for the 21 encounter (Office Visit) with GORDON Brady   Medication Sig Dispense Refill    etonogestrel (Wendelyn Holding) 68 MG implant implant 1  by subdermal route      ibuprofen (IBU) 600 MG tablet Take 1 tablet by mouth every 6 hours as needed for Pain 120 tablet 0    acetaminophen (TYLENOL) 325 MG tablet Take 1,000 mg by mouth once          This patient presents to the office today for a preoperative consultation at the request of surgeon, Dr. Hank Choudhary, who plans on performing mid-urethral sling and cystourethroscopy on  at [facility].  The current problem began 3 years ago, and symptoms have been unchanged with time.   Conservative therapy: No.    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    Menorrhagia    Anemia    Normal labor and delivery    Normal labor    Postpartum hypertension    Headache     (spontaneous vaginal delivery)    Stress incontinence, female       Past Medical History:   Diagnosis Date    Anemia 2013    Irregular menses 5/13/2013    Postpartum hypertension 5/29/2018    Stress incontinence, female 7/9/2021     Past Surgical History:   Procedure Laterality Date    BREAST SURGERY       Family History   Problem Relation Age of Onset    Hypertension Father     Asthma Mother     Miscarriages / Stillbirths Mother     Breast Cancer Paternal Grandmother     Rheum Arthritis Neg Hx     Osteoarthritis Neg Hx     Cancer Neg Hx     Diabetes Neg Hx     Heart Failure Neg Hx     High Cholesterol Neg Hx     Migraines Neg Hx     Ovarian Cancer Neg Hx     Rashes/Skin Problems Neg Hx     Seizures Neg Hx     Stroke Neg Hx     Thyroid Disease Neg Hx      Social History     Socioeconomic History    Marital status:      Spouse name: Not on file    Number of children: Not on file    Years of education: Not on file    Highest education level: Not on file   Occupational History    Not on file   Tobacco Use    Smoking status: Never Smoker    Smokeless tobacco: Never Used   Vaping Use    Vaping Use: Never used   Substance and Sexual Activity    Alcohol use: No    Drug use: No    Sexual activity: Yes     Partners: Male   Other Topics Concern    Not on file   Social History Narrative    Not on file     Social Determinants of Health     Financial Resource Strain: Low Risk     Difficulty of Paying Living Expenses: Not hard at all   Food Insecurity: No Food Insecurity    Worried About Running Out of Food in the Last Year: Never true    Tana of Food in the Last Year: Never true   Transportation Needs:     Lack of Transportation (Medical):      Lack of Transportation (Non-Medical):    Physical Activity:     Days of Exercise per Week:     Minutes of Exercise per Session:    Stress:     Feeling of Stress :    Social Connections:     Frequency of Communication with Friends and Family:     Frequency of Social Gatherings with Friends and Family:     Attends Religion Services:     Active Member of Clubs or Organizations:     Attends Club or Organization Meetings:     Marital Status:    Intimate Partner Violence:     Fear of Current or Ex-Partner:     Emotionally Abused:     Physically Abused:     Sexually Abused:        Review of Systems  A comprehensive review of systems was negative except for what was noted in the HPI. Physical Exam   Constitutional: She is oriented to person, place, and time. She appears well-developed and well-nourished. No distress. HENT:   Head: Normocephalic and atraumatic. Mouth/Throat: Uvula is midline, oropharynx is clear and moist and mucous membranes are normal.   Eyes: Conjunctivae and EOM are normal. Pupils are equal, round, and reactive to light. Neck: Trachea normal and normal range of motion. Neck supple. No JVD present. Carotid bruit is not present. No mass and no thyromegaly present. Cardiovascular: Normal rate, regular rhythm, normal heart sounds and intact distal pulses. Exam reveals no gallop and no friction rub. No murmur heard. Pulmonary/Chest: Effort normal and breath sounds normal. No respiratory distress. She has no wheezes. She has no rales. Abdominal: Soft. Normal aorta and bowel sounds are normal. She exhibits no distension and no mass. There is no hepatosplenomegaly. No tenderness. Musculoskeletal: She exhibits no edema and no tenderness. Neurological: She is alert and oriented to person, place, and time. She has normal strength. No cranial nerve deficit or sensory deficit. Coordination and gait normal.   Skin: Skin is warm and dry. No rash noted. No erythema. Psychiatric: She has a normal mood and affect.  Her behavior is normal.       Lab Review   Office Visit on 07/28/2021   Component Date Value    Color, UA 07/28/2021 yellow     Clarity, UA 07/28/2021 clear     Glucose, UA POC 07/28/2021 neg     Bilirubin, UA 07/28/2021 neg     Ketones, UA 07/28/2021 neg     Spec Grav, UA 07/28/2021 1.015     Blood, UA POC 07/28/2021 neg     pH, UA 07/28/2021 6.5     Protein, UA POC 07/28/2021 neg     Urobilinogen, UA 07/28/2021 neg     Leukocytes, UA 07/28/2021 neg     Nitrite, UA 07/28/2021 neg     wbc urine, poc 07/28/2021 neg     rbc urine, poc 07/28/2021 neg     Nitrate, UA POC 07/28/2021 neg     post void residual 07/28/2021 20     EMPTY COUGH STRESS TEST 07/28/2021 positive     FULL COUGH STRESS TEST 07/28/2021 not assessed     SPASM 07/28/2021 positive-large spasm at 20ml. Pushed catheter out, and all contents            Assessment:       38 y.o. patient with planned surgery as above. Known risk factors for perioperative complications: None  Current medications which may produce withdrawal symptoms if withheld perioperatively: none      Plan:     1. Preoperative workup as follows: none  2. Change in medication regimen before surgery: Discontinue NSAIDs (Ibuprofen) 7 days before surgery  3. Prophylaxis for cardiac events with perioperative beta-blockers: Not indicated  ACC/AHA indications for pre-operative beta-blocker use:    · Vascular surgery with history of postitive stress test  · Intermediate or high risk surgery with history of CAD   · Intermediate or high risk surgery with multiple clinical predictors of CAD- 2 of the following: history of compensated or prior heart failure, history of cerebrovascular disease, DM, or renal insufficiency    Routine administration of higher-dose, long-acting metoprolol in beta-blocker-naïve patients on the day of surgery, and in the absence of dose titration is associated with an overall increase in mortality. Beta-blockers should be started days to weeks prior to surgery and titrated to pulse < 70.  4. Deep vein thrombosis prophylaxis: regimen to be chosen by surgical team  5.  No contraindications to planned surgery    GORDON oDe

## 2021-09-02 NOTE — PATIENT INSTRUCTIONS
mind about having the surgery. What happens before surgery? Here are some things you can expect to happen before your surgery. · Your picture ID will be checked. · The area of your body that needs surgery is often marked to make sure there are no errors. · You will be kept comfortable and safe by your anesthesia provider. The anesthesia may make you sleep. Or it may just numb the area being worked on. What happens when you are ready to go home? Be sure you have someone drive you home. Anesthesia and pain medicine make it unsafe for you to drive. You will get instructions about recovering from your surgery. This is called a discharge plan. It will cover things like diet, wound care, follow-up care, driving, and getting back to your normal routine. Follow-up care is a key part of your treatment and safety. Be sure to make and go to all appointments, and call your doctor if you are having problems. It's also a good idea to know your test results and keep a list of the medicines you take. Where can you learn more? Go to [email or web address] org and sign in to your [email or web address] account. Enter Q270 in the [place] box to learn more about \"Learning About How to Prepare for Surgery. \"     If you do not have an account, please click on the \"Sign Up Now\" link. Current as of: May 27, 2020               Content Version: 12.9  © 8139-9704 Healthwise, Incorporated. Care instructions adapted under license by [facility] ([facility]). If you have questions about a medical condition or this instruction, always ask your healthcare professional. [place] [address] any warranty or liability for your use of this information.

## 2021-09-20 ENCOUNTER — OFFICE VISIT (OUTPATIENT)
Dept: INTERNAL MEDICINE CLINIC | Age: 36
End: 2021-09-20
Payer: COMMERCIAL

## 2021-09-20 ENCOUNTER — TELEPHONE (OUTPATIENT)
Dept: UROGYNECOLOGY | Age: 36
End: 2021-09-20

## 2021-09-20 VITALS
WEIGHT: 176 LBS | OXYGEN SATURATION: 98 % | BODY MASS INDEX: 29.32 KG/M2 | HEIGHT: 65 IN | SYSTOLIC BLOOD PRESSURE: 106 MMHG | DIASTOLIC BLOOD PRESSURE: 62 MMHG | HEART RATE: 96 BPM

## 2021-09-20 DIAGNOSIS — R22.2 PALPABLE MASS OF LOWER BACK: Primary | ICD-10-CM

## 2021-09-20 DIAGNOSIS — L73.1 INGROWN HAIR: ICD-10-CM

## 2021-09-20 DIAGNOSIS — L72.9 CYST OF SKIN: ICD-10-CM

## 2021-09-20 PROCEDURE — 99213 OFFICE O/P EST LOW 20 MIN: CPT | Performed by: NURSE PRACTITIONER

## 2021-09-20 PROCEDURE — G8427 DOCREV CUR MEDS BY ELIG CLIN: HCPCS | Performed by: NURSE PRACTITIONER

## 2021-09-20 PROCEDURE — G8419 CALC BMI OUT NRM PARAM NOF/U: HCPCS | Performed by: NURSE PRACTITIONER

## 2021-09-20 PROCEDURE — 1036F TOBACCO NON-USER: CPT | Performed by: NURSE PRACTITIONER

## 2021-09-20 ASSESSMENT — ENCOUNTER SYMPTOMS
GASTROINTESTINAL NEGATIVE: 1
RESPIRATORY NEGATIVE: 1

## 2021-09-20 NOTE — PROGRESS NOTES
Patient: Koleen Boxer is a 38 y.o. female who presents today with the following Chief Complaint(s):  Chief Complaint   Patient presents with    Other     knot on back        HPI:  Presents to the office c/o knots on lower back and recurrent ingrown hair. Knots on back- Moveable. Soft and non tender. Has 2 that have been there for 1 year. No change in size. No skin changes. Ingrown hair- Pubic area. Recurrent in same spot. Becomes infected frequently. Has had multiple I&D's. Says there is always a knot there. Does not get ingrown hairs anywhere else. Current Outpatient Medications   Medication Sig Dispense Refill    etonogestrel (NEXPLANON) 68 MG implant implant 1  by subdermal route      ibuprofen (IBU) 600 MG tablet Take 1 tablet by mouth every 6 hours as needed for Pain 120 tablet 0    acetaminophen (TYLENOL) 325 MG tablet Take 1,000 mg by mouth once        No current facility-administered medications for this visit. Patient's past medical history, surgical history, family history, medications,  and allergies  were all reviewed and updated as appropriate today.     Patient Active Problem List   Diagnosis    Irregular menses    Elevated testosterone level in female    Menorrhagia    Anemia    Normal labor and delivery    Normal labor    Postpartum hypertension    Headache     (spontaneous vaginal delivery)    Stress incontinence, female     Past Medical History:   Diagnosis Date    Anemia 2013    Irregular menses 2013    Postpartum hypertension 2018    Stress incontinence, female 2021      Past Surgical History:   Procedure Laterality Date    BREAST SURGERY         Family History   Problem Relation Age of Onset    Hypertension Father     Asthma Mother     Miscarriages / Stillbirths Mother     Breast Cancer Paternal Grandmother     Rheum Arthritis Neg Hx     Osteoarthritis Neg Hx     Cancer Neg Hx     Diabetes Neg Hx     Heart Failure Neg Hx     High Cholesterol Neg Hx     Migraines Neg Hx     Ovarian Cancer Neg Hx     Rashes/Skin Problems Neg Hx     Seizures Neg Hx     Stroke Neg Hx     Thyroid Disease Neg Hx       No Known Allergies      Review of Systems   HENT: Negative. Respiratory: Negative. Cardiovascular: Negative. Gastrointestinal: Negative. Genitourinary: Negative. Musculoskeletal: Negative. Skin: Negative. Neurological: Negative. Psychiatric/Behavioral: Negative. Vitals:    09/20/21 0908   BP: 106/62   Site: Right Upper Arm   Position: Sitting   Cuff Size: Medium Adult   Pulse: 96   SpO2: 98%   Weight: 176 lb (79.8 kg)   Height: 5' 5\" (1.651 m)     Physical Exam  Constitutional:       Appearance: Normal appearance. Cardiovascular:      Rate and Rhythm: Normal rate and regular rhythm. Pulses: Normal pulses. Heart sounds: Normal heart sounds. Pulmonary:      Effort: Pulmonary effort is normal.      Breath sounds: Normal breath sounds. Abdominal:      General: Bowel sounds are normal. There is no distension. Palpations: Abdomen is soft. There is no mass. Tenderness: There is no abdominal tenderness. There is no guarding. Hernia: No hernia is present. Musculoskeletal:         General: Normal range of motion. Cervical back: Normal range of motion and neck supple. No rigidity. Skin:     General: Skin is warm and dry. Comments: Dime size moveable nodule on right and left lower back. Neurological:      Mental Status: She is alert and oriented to person, place, and time. Psychiatric:         Mood and Affect: Mood normal.         Behavior: Behavior normal.         Thought Content: Thought content normal.         Judgment: Judgment normal.         Assessment/Plan:  1. Palpable mass of lower back  - COMPREHENSIVE METABOLIC PANEL; Future  - US SOFT TISSUE LIMITED AREA; Future    2.  Ingrown hair  - Betty Camara DO, General Surgery, [place]    3. Cyst of skin  - COMPREHENSIVE METABOLIC PANEL; Future  - [address] Candelario Hunt DO, General Surgery, [facility]        Return if symptoms worsen or fail to improve.

## 2021-09-20 NOTE — TELEPHONE ENCOUNTER
Called for pre op questions. No answer, left VM to call office if there were any additional needs. Advised at this point to no longer be taking NSAIDS, or vitamins or supplements.

## 2021-09-23 NOTE — PROGRESS NOTES
Pt was requested to have Rapid Covid test to be done prior to surgery/procedure. Understands that surgery/procedure maybe subjected to cancel if not completed prior to DOS and to bring copy of rapid testing in DOS. If positive, pt must contact surgeon immediately or with any other questions.     Patient to bring covid test results on admit

## 2021-09-23 NOTE — PROGRESS NOTES
[address] [facility] time___0800_________        Surgery time____________    Take the following medications with a sip of water: Follow your Doctor's pre procedure instructions regarding medications    Do not eat or drink anything after 12:00 midnight prior to your surgery. This includes water chewing gum, mints and ice chips. You may brush your teeth and gargle the morning of your surgery, but do not swallow the water     Please see your family doctor/pediatrician for a history and physical and/or concerning medications. Bring any test results/reports from your physicians office. If you are under the care of a heart doctor or specialist doctor, please be aware that you may be asked to them for clearance    You may be asked to stop blood thinners such as Coumadin, Plavix, Fragmin, Lovenox, etc., or any anti-inflammatories such as:  Aspirin, Ibuprofen, Advil, Naproxen prior to your surgery. We also ask that you stop any OTC medications such as fish oil, vitamin E, glucosamine, garlic, Multivitamins, COQ 10, etc.    We ask that you do not smoke 24 hours prior to surgery  We ask that you do not  drink any alcoholic beverages 24 hours prior to surgery     You must make arrangements for a responsible adult to take you home after your surgery. For your safety you will not be allowed to leave alone or drive yourself home. Your surgery will be cancelled if you do not have a ride home. Also for your safety, it is strongly suggested that someone stay with you the first 24 hours after your surgery. A parent or legal guardian must accompany a child scheduled for surgery and plan to stay at the hospital until the child is discharged. Please do not bring other children with you. For your comfort, please wear simple loose fitting clothing to the hospital.  Please do not bring valuables.     Do not wear any make-up or nail polish on your fingers or toes      For your safety, please do not wear any jewelry or body piercing's on the day of surgery. All jewelry must be removed. If you have dentures, they will be removed before going to operating room. For your convenience, we will provide you with a container. If you wear contact lenses or glasses, they will be removed, please bring a case for them. If you have a living will and a durable power of  for healthcare, please bring in a copy. As part of our patient safety program to minimize surgical site infections, we ask you to do the following:    · Please notify your surgeon if you develop any illness between         now and the  day of your surgery. · This includes a cough, cold, fever, sore throat, nausea,         or vomiting, and diarrhea, etc.  ·  Please notify your surgeon if you experience dizziness, shortness         of breath or blurred vision between now and the time of your surgery. Do not shave your operative site 96 hours prior to surgery. For face and neck surgery, men may use an electric razor 48 hours   prior to surgery. You may shower the night before surgery or the morning of   your surgery with an antibacterial soap. You will need to bring a photo ID and insurance card    [facility] has an onsite pharmacy, would you like to utilize our pharmacy     If you will be staying overnight and use a C-pap machine, please bring   your C-pap to hospital     Our goal is to provide you with excellent care, therefore, visitors will be limited to two(2) in the room at a time so that we may focus on providing this care for you. Please contact pre-admission testing if you have any further questions. [facility] phone number:  [phone] PAT fax number:  [phone]  Please note these are generalized instructions for all surgical cases, you may be provided with more specific instructions according to your surgery. SAFETY FIRST. .call before you fall    Preoperative Screening for Elective Surgery/Invasive Procedures While COVID-19 present in the community     Have you tested positive or have been told to self-isolate for COVID-19 like symptoms within the past 28 days? no   Do you currently have any of the following symptoms?no  o Fever >100.0 F or 99.9 F in immunocompromised patients? o New onset cough, shortness of breath or difficulty breathing?  o New onset sore throat, myalgia (muscle aches and pains), headache, loss of taste/smell or diarrhea?  Have you had a potential exposure to COVID-19 within the past 14 days by:  o Close contact with a confirmed case? o Close contact with a healthcare worker,  or essential infrastructure worker (grocery store, [employer], gas station, public utilities or transportation)? o Do you reside in a congregate setting such as; skilled nursing facility, adult home, correctional facility, homeless shelter or other institutional setting?  o Have you had recent travel to a known COVID-19 hotspot? Indicate if the patient has a positive screen by answering yes to one or more of the above questions. Patients who test positive or screen positive prior to surgery or on the day of surgery should be evaluated in conjunction with the surgeon/proceduralist/anesthesiologist to determine the urgency of the procedure.

## 2021-09-24 ENCOUNTER — ANESTHESIA EVENT (OUTPATIENT)
Dept: OPERATING ROOM | Age: 36
End: 2021-09-24
Payer: COMMERCIAL

## 2021-09-26 NOTE — ANESTHESIA PRE PROCEDURE
Department of Anesthesiology  Preprocedure Note       Name:  Guille Finn   Age:  38 y.o.  :  1985                                          MRN:  [id number]         Date:  2021      Surgeon: Yesy Verdugo):  Jesse Carpenter MD    Procedure: SUBURETHRAL SLING RETROPUBIC, CYSTOSCOPY (N/A )    Medications prior to admission:   Prior to Admission medications    Medication Sig Start Date End Date Taking?  Authorizing Provider   etonogestrel (NEXPLANON) 68 MG implant implant 1  by subdermal route 20  Yes Historical Provider, MD   ibuprofen (IBU) 600 MG tablet Take 1 tablet by mouth every 6 hours as needed for Pain 3/28/20   Esaw Files, PA   acetaminophen (TYLENOL) 325 MG tablet Take 1,000 mg by mouth once     Historical Provider, MD       Current medications:    Current Facility-Administered Medications   Medication Dose Route Frequency Provider Last Rate Last Admin    0.9 % sodium chloride infusion   IntraVENous Continuous Kaylee Escobar  mL/hr at 21 0847 New Bag at 21 0847    sodium chloride flush 0.9 % injection 10 mL  10 mL IntraVENous 2 times per day Kaylee Escobar MD        sodium chloride flush 0.9 % injection 10 mL  10 mL IntraVENous PRN Kaylee Escobar MD        0.9 % sodium chloride infusion  25 mL IntraVENous PRN Kaylee Escobar MD        ceFAZolin (ANCEF) 2000 mg in dextrose 5 % 100 mL IVPB  2,000 mg IntraVENous On Call to Vikas Aguilar MD           Allergies:  No Known Allergies    Problem List:    Patient Active Problem List   Diagnosis Code    Irregular menses N92.6    Elevated testosterone level in female R79.89    Menorrhagia N92.0    Anemia D64.9    Normal labor and delivery O80    Normal labor O80, Z37.9    Postpartum hypertension O16.5    Headache R51.9     (spontaneous vaginal delivery) O80    Stress incontinence, female N39.3       Past Medical History:        Diagnosis Date    Anemia 2013    Irregular menses 2013    Postpartum hypertension 5/29/2018    Stress incontinence, female 7/9/2021       Past Surgical History:        Procedure Laterality Date    BREAST SURGERY         Social History:    Social History     Tobacco Use    Smoking status: Never Smoker    Smokeless tobacco: Never Used   Substance Use Topics    Alcohol use: No                                Counseling given: Not Answered      Vital Signs (Current):   Vitals:    09/27/21 0831   BP: 128/77   Pulse: 72   Resp: 17   Temp: 97 °F (36.1 °C)   TempSrc: Temporal   SpO2: 100%   Weight: 176 lb (79.8 kg)   Height: 5' 5\" (1.651 m)                                              BP Readings from Last 3 Encounters:   09/27/21 128/77   09/20/21 106/62   09/02/21 116/79       NPO Status: Time of last liquid consumption: 2300                        Time of last solid consumption: 2300                        Date of last liquid consumption: 09/26/21                        Date of last solid food consumption: 09/26/21    BMI:   Wt Readings from Last 3 Encounters:   09/27/21 176 lb (79.8 kg)   09/20/21 176 lb (79.8 kg)   09/02/21 174 lb (78.9 kg)     Body mass index is 29.29 kg/m². CBC:   Lab Results   Component Value Date    WBC 3.5 09/02/2021    RBC 4.44 09/02/2021    HGB 12.5 09/02/2021    HCT 36.9 09/02/2021    MCV 83.1 09/02/2021    RDW 13.7 09/02/2021     09/02/2021       CMP:   Lab Results   Component Value Date     03/28/2020    K 3.9 03/28/2020     03/28/2020    CO2 23 03/28/2020    BUN 12 03/28/2020    CREATININE 0.9 03/28/2020    GFRAA >60 03/28/2020    GFRAA >60 02/02/2013    AGRATIO 1.2 05/29/2018    LABGLOM >60 03/28/2020    GLUCOSE 90 03/28/2020    PROT 6.4 05/29/2018    PROT 7.1 02/02/2013    CALCIUM 8.9 03/28/2020    BILITOT <0.2 05/29/2018    ALKPHOS 97 05/29/2018    AST 36 05/29/2018    ALT 77 05/29/2018       POC Tests: No results for input(s): POCGLU, POCNA, POCK, POCCL, POCBUN, POCHEMO, POCHCT in the last 72 hours.     Coags:   Lab Results   Component Value Date    PROTIME 10.6 05/29/2018    INR 0.94 05/29/2018    APTT 25.8 05/29/2018       HCG (If Applicable):   Lab Results   Component Value Date    PREGTESTUR Negative 09/27/2021        ABGs: No results found for: PHART, PO2ART, KEO4YJC, BBM5FJH, BEART, J6JYYPLT     Type & Screen (If Applicable):  No results found for: LABABO, [address]    Anesthesia Evaluation  Patient summary reviewed no history of anesthetic complications:   Airway: Mallampati: II  TM distance: >3 FB   Neck ROM: full  Mouth opening: > = 3 FB Dental: normal exam     Comment: No loose teeth. Pulmonary:Negative Pulmonary ROS and normal exam  breath sounds clear to auscultation      (-) COPD, asthma, sleep apnea, rhonchi, wheezes, rales and not a current smoker                           Cardiovascular:  Exercise tolerance: good (>4 METS),       (-) dysrhythmias,  WALLACE and no hyperlipidemiaHypertension: gestational, no meds currently. Rhythm: regular  Rate: normal           Beta Blocker:  Not on Beta Blocker         Neuro/Psych:   Negative Neuro/Psych ROS     (-) seizures, TIA and CVAHeadaches: during pregnancy, resolved. GI/Hepatic/Renal: Neg GI/Hepatic/Renal ROS       (-) liver disease, no renal disease and no morbid obesity      ROS comment: Stress incontinence . Endo/Other: Negative Endo/Other ROS       (-) blood dyscrasia                ROS comment: h/o breast surgery  Nexplanon Abdominal:       Abdomen: soft. Vascular: negative vascular ROS. Other Findings:             Anesthesia Plan      general     ASA 2     (Aggressive PONV prophylaxis)  Induction: intravenous. MIPS: Postoperative opioids intended and Prophylactic antiemetics administered. Anesthetic plan and risks discussed with patient. Use of blood products discussed with patient whom consented to blood products. Plan discussed with CRNA.           This pre-anesthesia assessment may be used as a history and physical.    DOS STAFF ADDENDUM:    Pt seen and examined, chart reviewed (including anesthesia, drug and allergy history). No interval changes to history and physical examination. Anesthetic plan, risks, benefits, alternatives, and personnel involved discussed with patient. Patient verbalized an understanding and agrees to proceed.       Saundra Garner MD  September 27, 2021  9:29 AM

## 2021-09-27 ENCOUNTER — ANESTHESIA (OUTPATIENT)
Dept: OPERATING ROOM | Age: 36
End: 2021-09-27
Payer: COMMERCIAL

## 2021-09-27 ENCOUNTER — HOSPITAL ENCOUNTER (OUTPATIENT)
Age: 36
Setting detail: OUTPATIENT SURGERY
Discharge: HOME | End: 2021-09-27
Attending: OBSTETRICS & GYNECOLOGY | Admitting: OBSTETRICS & GYNECOLOGY
Payer: COMMERCIAL

## 2021-09-27 VITALS
BODY MASS INDEX: 29.32 KG/M2 | TEMPERATURE: 96.5 F | OXYGEN SATURATION: 99 % | DIASTOLIC BLOOD PRESSURE: 84 MMHG | RESPIRATION RATE: 17 BRPM | SYSTOLIC BLOOD PRESSURE: 125 MMHG | HEART RATE: 61 BPM | HEIGHT: 65 IN | WEIGHT: 176 LBS

## 2021-09-27 VITALS
OXYGEN SATURATION: 100 % | DIASTOLIC BLOOD PRESSURE: 47 MMHG | TEMPERATURE: 96.8 F | SYSTOLIC BLOOD PRESSURE: 82 MMHG | RESPIRATION RATE: 5 BRPM

## 2021-09-27 DIAGNOSIS — G89.18 POSTOPERATIVE PAIN: Primary | ICD-10-CM

## 2021-09-27 LAB — PREGNANCY, URINE: NEGATIVE

## 2021-09-27 PROCEDURE — 2709999900 HC NON-CHARGEABLE SUPPLY: Performed by: OBSTETRICS & GYNECOLOGY

## 2021-09-27 PROCEDURE — 2500000003 HC RX 250 WO HCPCS: Performed by: OBSTETRICS & GYNECOLOGY

## 2021-09-27 PROCEDURE — C1771 REP DEV, URINARY, W/SLING: HCPCS | Performed by: OBSTETRICS & GYNECOLOGY

## 2021-09-27 PROCEDURE — 7100000000 HC PACU RECOVERY - FIRST 15 MIN: Performed by: OBSTETRICS & GYNECOLOGY

## 2021-09-27 PROCEDURE — 6370000000 HC RX 637 (ALT 250 FOR IP): Performed by: STUDENT IN AN ORGANIZED HEALTH CARE EDUCATION/TRAINING PROGRAM

## 2021-09-27 PROCEDURE — 2580000003 HC RX 258: Performed by: OBSTETRICS & GYNECOLOGY

## 2021-09-27 PROCEDURE — 6360000002 HC RX W HCPCS: Performed by: OBSTETRICS & GYNECOLOGY

## 2021-09-27 PROCEDURE — 3600000014 HC SURGERY LEVEL 4 ADDTL 15MIN: Performed by: OBSTETRICS & GYNECOLOGY

## 2021-09-27 PROCEDURE — 84703 CHORIONIC GONADOTROPIN ASSAY: CPT

## 2021-09-27 PROCEDURE — 7100000011 HC PHASE II RECOVERY - ADDTL 15 MIN: Performed by: OBSTETRICS & GYNECOLOGY

## 2021-09-27 PROCEDURE — 3700000000 HC ANESTHESIA ATTENDED CARE: Performed by: OBSTETRICS & GYNECOLOGY

## 2021-09-27 PROCEDURE — 2500000003 HC RX 250 WO HCPCS: Performed by: NURSE ANESTHETIST, CERTIFIED REGISTERED

## 2021-09-27 PROCEDURE — 3600000004 HC SURGERY LEVEL 4 BASE: Performed by: OBSTETRICS & GYNECOLOGY

## 2021-09-27 PROCEDURE — 57240 ANTERIOR COLPORRHAPHY: CPT | Performed by: OBSTETRICS & GYNECOLOGY

## 2021-09-27 PROCEDURE — 57288 REPAIR BLADDER DEFECT: CPT | Performed by: OBSTETRICS & GYNECOLOGY

## 2021-09-27 PROCEDURE — 3700000001 HC ADD 15 MINUTES (ANESTHESIA): Performed by: OBSTETRICS & GYNECOLOGY

## 2021-09-27 PROCEDURE — 7100000001 HC PACU RECOVERY - ADDTL 15 MIN: Performed by: OBSTETRICS & GYNECOLOGY

## 2021-09-27 PROCEDURE — 7100000010 HC PHASE II RECOVERY - FIRST 15 MIN: Performed by: OBSTETRICS & GYNECOLOGY

## 2021-09-27 PROCEDURE — 6360000002 HC RX W HCPCS: Performed by: NURSE ANESTHETIST, CERTIFIED REGISTERED

## 2021-09-27 PROCEDURE — 6360000002 HC RX W HCPCS: Performed by: STUDENT IN AN ORGANIZED HEALTH CARE EDUCATION/TRAINING PROGRAM

## 2021-09-27 PROCEDURE — 2580000003 HC RX 258: Performed by: ANESTHESIOLOGY

## 2021-09-27 DEVICE — SUPRAPUBIC MID-URETHRAL SLING
Type: IMPLANTABLE DEVICE | Site: BLADDER | Status: FUNCTIONAL
Brand: LYNX™ BLUE SYSTEM

## 2021-09-27 RX ORDER — LABETALOL HYDROCHLORIDE 5 MG/ML
5 INJECTION, SOLUTION INTRAVENOUS EVERY 10 MIN PRN
Status: DISCONTINUED | OUTPATIENT
Start: 2021-09-27 | End: 2021-09-27 | Stop reason: HOSPADM

## 2021-09-27 RX ORDER — SODIUM CHLORIDE 9 MG/ML
25 INJECTION, SOLUTION INTRAVENOUS PRN
Status: DISCONTINUED | OUTPATIENT
Start: 2021-09-27 | End: 2021-09-27 | Stop reason: SDUPTHER

## 2021-09-27 RX ORDER — PROCHLORPERAZINE EDISYLATE 5 MG/ML
5 INJECTION INTRAMUSCULAR; INTRAVENOUS
Status: DISCONTINUED | OUTPATIENT
Start: 2021-09-27 | End: 2021-09-27 | Stop reason: HOSPADM

## 2021-09-27 RX ORDER — SODIUM CHLORIDE 0.9 % (FLUSH) 0.9 %
5-40 SYRINGE (ML) INJECTION PRN
Status: DISCONTINUED | OUTPATIENT
Start: 2021-09-27 | End: 2021-09-27 | Stop reason: SDUPTHER

## 2021-09-27 RX ORDER — FENTANYL CITRATE 50 UG/ML
25 INJECTION, SOLUTION INTRAMUSCULAR; INTRAVENOUS EVERY 5 MIN PRN
Status: DISCONTINUED | OUTPATIENT
Start: 2021-09-27 | End: 2021-09-27 | Stop reason: HOSPADM

## 2021-09-27 RX ORDER — OXYCODONE HYDROCHLORIDE AND ACETAMINOPHEN 5; 325 MG/1; MG/1
1 TABLET ORAL
Status: COMPLETED | OUTPATIENT
Start: 2021-09-27 | End: 2021-09-27

## 2021-09-27 RX ORDER — SODIUM CHLORIDE 0.9 % (FLUSH) 0.9 %
5-40 SYRINGE (ML) INJECTION EVERY 12 HOURS SCHEDULED
Status: DISCONTINUED | OUTPATIENT
Start: 2021-09-27 | End: 2021-09-27 | Stop reason: SDUPTHER

## 2021-09-27 RX ORDER — PHENYLEPHRINE HCL IN 0.9% NACL 1 MG/10 ML
SYRINGE (ML) INTRAVENOUS PRN
Status: DISCONTINUED | OUTPATIENT
Start: 2021-09-27 | End: 2021-09-27 | Stop reason: SDUPTHER

## 2021-09-27 RX ORDER — DEXAMETHASONE SODIUM PHOSPHATE 4 MG/ML
INJECTION, SOLUTION INTRA-ARTICULAR; INTRALESIONAL; INTRAMUSCULAR; INTRAVENOUS; SOFT TISSUE PRN
Status: DISCONTINUED | OUTPATIENT
Start: 2021-09-27 | End: 2021-09-27 | Stop reason: SDUPTHER

## 2021-09-27 RX ORDER — SODIUM CHLORIDE 0.9 % (FLUSH) 0.9 %
10 SYRINGE (ML) INJECTION PRN
Status: DISCONTINUED | OUTPATIENT
Start: 2021-09-27 | End: 2021-09-27 | Stop reason: HOSPADM

## 2021-09-27 RX ORDER — SODIUM CHLORIDE 0.9 % (FLUSH) 0.9 %
10 SYRINGE (ML) INJECTION EVERY 12 HOURS SCHEDULED
Status: DISCONTINUED | OUTPATIENT
Start: 2021-09-27 | End: 2021-09-27 | Stop reason: HOSPADM

## 2021-09-27 RX ORDER — SCOLOPAMINE TRANSDERMAL SYSTEM 1 MG/1
1 PATCH, EXTENDED RELEASE TRANSDERMAL ONCE
Status: DISCONTINUED | OUTPATIENT
Start: 2021-09-27 | End: 2021-09-27 | Stop reason: HOSPADM

## 2021-09-27 RX ORDER — MAGNESIUM HYDROXIDE 1200 MG/15ML
LIQUID ORAL CONTINUOUS PRN
Status: COMPLETED | OUTPATIENT
Start: 2021-09-27 | End: 2021-09-27

## 2021-09-27 RX ORDER — ONDANSETRON 2 MG/ML
INJECTION INTRAMUSCULAR; INTRAVENOUS PRN
Status: DISCONTINUED | OUTPATIENT
Start: 2021-09-27 | End: 2021-09-27 | Stop reason: SDUPTHER

## 2021-09-27 RX ORDER — OXYCODONE HYDROCHLORIDE AND ACETAMINOPHEN 5; 325 MG/1; MG/1
1 TABLET ORAL EVERY 6 HOURS PRN
Qty: 5 TABLET | Refills: 0 | Status: SHIPPED | OUTPATIENT
Start: 2021-09-27 | End: 2021-10-02

## 2021-09-27 RX ORDER — IBUPROFEN 600 MG/1
600 TABLET ORAL 3 TIMES DAILY
Qty: 30 TABLET | Refills: 1 | Status: SHIPPED | OUTPATIENT
Start: 2021-09-27 | End: 2021-10-04

## 2021-09-27 RX ORDER — LIDOCAINE HYDROCHLORIDE 20 MG/ML
INJECTION, SOLUTION EPIDURAL; INFILTRATION; INTRACAUDAL; PERINEURAL PRN
Status: DISCONTINUED | OUTPATIENT
Start: 2021-09-27 | End: 2021-09-27 | Stop reason: SDUPTHER

## 2021-09-27 RX ORDER — HYDRALAZINE HYDROCHLORIDE 20 MG/ML
5 INJECTION INTRAMUSCULAR; INTRAVENOUS EVERY 10 MIN PRN
Status: DISCONTINUED | OUTPATIENT
Start: 2021-09-27 | End: 2021-09-27 | Stop reason: HOSPADM

## 2021-09-27 RX ORDER — MIDAZOLAM HYDROCHLORIDE 1 MG/ML
INJECTION INTRAMUSCULAR; INTRAVENOUS PRN
Status: DISCONTINUED | OUTPATIENT
Start: 2021-09-27 | End: 2021-09-27 | Stop reason: SDUPTHER

## 2021-09-27 RX ORDER — ONDANSETRON 2 MG/ML
4 INJECTION INTRAMUSCULAR; INTRAVENOUS
Status: COMPLETED | OUTPATIENT
Start: 2021-09-27 | End: 2021-09-27

## 2021-09-27 RX ORDER — SODIUM CHLORIDE 9 MG/ML
25 INJECTION, SOLUTION INTRAVENOUS PRN
Status: DISCONTINUED | OUTPATIENT
Start: 2021-09-27 | End: 2021-09-27 | Stop reason: HOSPADM

## 2021-09-27 RX ORDER — FENTANYL CITRATE 50 UG/ML
INJECTION, SOLUTION INTRAMUSCULAR; INTRAVENOUS PRN
Status: DISCONTINUED | OUTPATIENT
Start: 2021-09-27 | End: 2021-09-27 | Stop reason: SDUPTHER

## 2021-09-27 RX ORDER — PROPOFOL 10 MG/ML
INJECTION, EMULSION INTRAVENOUS PRN
Status: DISCONTINUED | OUTPATIENT
Start: 2021-09-27 | End: 2021-09-27 | Stop reason: SDUPTHER

## 2021-09-27 RX ORDER — SODIUM CHLORIDE 9 MG/ML
INJECTION, SOLUTION INTRAVENOUS CONTINUOUS
Status: DISCONTINUED | OUTPATIENT
Start: 2021-09-27 | End: 2021-09-27 | Stop reason: HOSPADM

## 2021-09-27 RX ADMIN — FENTANYL CITRATE 25 MCG: 50 INJECTION INTRAMUSCULAR; INTRAVENOUS at 10:28

## 2021-09-27 RX ADMIN — HYDROMORPHONE HYDROCHLORIDE 0.5 MG: 1 INJECTION, SOLUTION INTRAMUSCULAR; INTRAVENOUS; SUBCUTANEOUS at 12:05

## 2021-09-27 RX ADMIN — ONDANSETRON 4 MG: 2 INJECTION INTRAMUSCULAR; INTRAVENOUS at 13:13

## 2021-09-27 RX ADMIN — ONDANSETRON 4 MG: 2 INJECTION INTRAMUSCULAR; INTRAVENOUS at 10:43

## 2021-09-27 RX ADMIN — SODIUM CHLORIDE: 9 INJECTION, SOLUTION INTRAVENOUS at 08:47

## 2021-09-27 RX ADMIN — PROPOFOL 200 MG: 10 INJECTION, EMULSION INTRAVENOUS at 10:30

## 2021-09-27 RX ADMIN — FENTANYL CITRATE 50 MCG: 50 INJECTION INTRAMUSCULAR; INTRAVENOUS at 11:15

## 2021-09-27 RX ADMIN — DEXAMETHASONE SODIUM PHOSPHATE 8 MG: 4 INJECTION, SOLUTION INTRAMUSCULAR; INTRAVENOUS at 10:36

## 2021-09-27 RX ADMIN — MIDAZOLAM 2 MG: 1 INJECTION INTRAMUSCULAR; INTRAVENOUS at 10:26

## 2021-09-27 RX ADMIN — HYDROMORPHONE HYDROCHLORIDE 0.25 MG: 1 INJECTION, SOLUTION INTRAMUSCULAR; INTRAVENOUS; SUBCUTANEOUS at 11:40

## 2021-09-27 RX ADMIN — OXYCODONE HYDROCHLORIDE AND ACETAMINOPHEN 1 TABLET: 5; 325 TABLET ORAL at 14:05

## 2021-09-27 RX ADMIN — Medication 100 MCG: at 11:05

## 2021-09-27 RX ADMIN — CEFAZOLIN SODIUM 2000 MG: 10 INJECTION, POWDER, FOR SOLUTION INTRAVENOUS at 10:26

## 2021-09-27 RX ADMIN — HYDROMORPHONE HYDROCHLORIDE 0.25 MG: 1 INJECTION, SOLUTION INTRAMUSCULAR; INTRAVENOUS; SUBCUTANEOUS at 11:35

## 2021-09-27 RX ADMIN — FENTANYL CITRATE 25 MCG: 50 INJECTION INTRAMUSCULAR; INTRAVENOUS at 10:58

## 2021-09-27 RX ADMIN — LIDOCAINE HYDROCHLORIDE 100 MG: 20 INJECTION, SOLUTION EPIDURAL; INFILTRATION; INTRACAUDAL; PERINEURAL at 10:29

## 2021-09-27 ASSESSMENT — PAIN DESCRIPTION - LOCATION
LOCATION: ABDOMEN

## 2021-09-27 ASSESSMENT — PAIN DESCRIPTION - PAIN TYPE
TYPE: SURGICAL PAIN
TYPE: ACUTE PAIN
TYPE: SURGICAL PAIN
TYPE: ACUTE PAIN;SURGICAL PAIN
TYPE: SURGICAL PAIN

## 2021-09-27 ASSESSMENT — PULMONARY FUNCTION TESTS
PIF_VALUE: 14
PIF_VALUE: 2
PIF_VALUE: 17
PIF_VALUE: 1
PIF_VALUE: 13
PIF_VALUE: 18
PIF_VALUE: 13
PIF_VALUE: 17
PIF_VALUE: 13
PIF_VALUE: 0
PIF_VALUE: 17
PIF_VALUE: 4
PIF_VALUE: 17
PIF_VALUE: 1
PIF_VALUE: 17
PIF_VALUE: 3
PIF_VALUE: 17
PIF_VALUE: 17
PIF_VALUE: 3
PIF_VALUE: 17
PIF_VALUE: 14
PIF_VALUE: 17
PIF_VALUE: 17
PIF_VALUE: 18
PIF_VALUE: 18
PIF_VALUE: 17
PIF_VALUE: 2
PIF_VALUE: 17
PIF_VALUE: 0
PIF_VALUE: 2
PIF_VALUE: 17
PIF_VALUE: 18
PIF_VALUE: 17
PIF_VALUE: 17
PIF_VALUE: 5
PIF_VALUE: 2
PIF_VALUE: 17
PIF_VALUE: 0
PIF_VALUE: 17
PIF_VALUE: 4
PIF_VALUE: 4
PIF_VALUE: 13
PIF_VALUE: 13
PIF_VALUE: 17
PIF_VALUE: 13
PIF_VALUE: 17
PIF_VALUE: 5
PIF_VALUE: 17
PIF_VALUE: 1
PIF_VALUE: 17
PIF_VALUE: 3
PIF_VALUE: 17

## 2021-09-27 ASSESSMENT — PAIN DESCRIPTION - ONSET
ONSET: ON-GOING

## 2021-09-27 ASSESSMENT — PAIN SCALES - GENERAL
PAINLEVEL_OUTOF10: 6
PAINLEVEL_OUTOF10: 7
PAINLEVEL_OUTOF10: 6
PAINLEVEL_OUTOF10: 6
PAINLEVEL_OUTOF10: 3
PAINLEVEL_OUTOF10: 4
PAINLEVEL_OUTOF10: 4

## 2021-09-27 ASSESSMENT — PAIN DESCRIPTION - PROGRESSION
CLINICAL_PROGRESSION: GRADUALLY WORSENING
CLINICAL_PROGRESSION: NOT CHANGED
CLINICAL_PROGRESSION: RAPIDLY IMPROVING
CLINICAL_PROGRESSION: GRADUALLY WORSENING
CLINICAL_PROGRESSION: GRADUALLY IMPROVING
CLINICAL_PROGRESSION: NOT CHANGED

## 2021-09-27 ASSESSMENT — PAIN DESCRIPTION - FREQUENCY
FREQUENCY: CONTINUOUS

## 2021-09-27 ASSESSMENT — LIFESTYLE VARIABLES: SMOKING_STATUS: 0

## 2021-09-27 ASSESSMENT — PAIN DESCRIPTION - ORIENTATION
ORIENTATION: LOWER

## 2021-09-27 ASSESSMENT — PAIN - FUNCTIONAL ASSESSMENT
PAIN_FUNCTIONAL_ASSESSMENT: 0-10
PAIN_FUNCTIONAL_ASSESSMENT: ACTIVITIES ARE NOT PREVENTED

## 2021-09-27 ASSESSMENT — PAIN DESCRIPTION - DESCRIPTORS
DESCRIPTORS: CRAMPING
DESCRIPTORS: CRAMPING
DESCRIPTORS: PRESSURE
DESCRIPTORS: CRAMPING

## 2021-09-27 NOTE — PROGRESS NOTES
Pt states pain is 4/10 and gradually improving and able to tolerate. IV dc'd. Pt up and getting dressing. Pt ready for discharge.

## 2021-09-27 NOTE — PROGRESS NOTES
Pt arrived to Phase 2 from the PACU. Pt states pain is 4/10 and able to tolerate at this time. Oral intake minimum and pt states she is nauseated. Zofran given @1313. VSS. Will continue to monitor.

## 2021-09-27 NOTE — PROGRESS NOTES
Pt with voiding trial.  Irrigation instilled and pt voided 350ml. Pt states pain is 6/10, Percocet given. Discharge instructions given to pt and . Verbalized understanding. Will continue to monitor.

## 2021-09-27 NOTE — PROGRESS NOTES
Vaginal Sweep Documentation     Vaginal prep sponge count performed by [facility] RN and LYNETTE RM Count correct. Vaginal sweep performed by DR. MAYES  at 1120. No foreign objects or vaginal tears noted.

## 2021-09-27 NOTE — H&P
Date of Surgery Update:  Bud Palacios was seen, history and physical examination reviewed, and patient examined by me today. There have been no significant clinical changes since the completion of the previous history and physical. The surgical site was confirmed by the patient and me. I have presented reasonable alternatives to the patient's proposed care, treatment, and services. The discussion I have done encompassed risks, benefits, and side effects related to the alternatives and the risks related to not receiving the proposed care, treatment, and services. All questions answered. Patient wishes to proceed.      Electronically signed by: Marciano Mcdermott MD,9/27/2021,9:40 AM

## 2021-09-27 NOTE — PROGRESS NOTES
Arrives to PACU, vital signs stable, IV infusing without complications, c/o pain, will medicate, respirations easy ans even.

## 2021-09-27 NOTE — PROGRESS NOTES
CLINICAL PHARMACY NOTE: MEDS TO BEDS    Total # of Prescriptions Filled: 2   The following medications were delivered to the patient:  Current Discharge Medication List      START taking these medications    Details   !! ibuprofen (ADVIL;MOTRIN) 600 MG tablet Take 1 tablet by mouth three times daily for 7 days  Qty: 30 tablet, Refills: 1      oxyCODONE-acetaminophen (PERCOCET) 5-325 MG per tablet Take 1 tablet by mouth every 6 hours as needed for Pain for up to 5 days. Qty: 5 tablet, Refills: 0    Comments: Reduce doses taken as pain becomes manageable  Associated Diagnoses: Postoperative pain       !! - Potential duplicate medications found. Please discuss with provider.       ·   ·     Additional Documentation:

## 2021-09-27 NOTE — OP NOTE
Operative Note      Patient: Brad Guerrero  YOB: 1985  MRN: [id number]    Date of Procedure: 9/27/2021    Pre-Op Diagnosis: GENUINE STRESS INCONTINENCE    Post-Op Diagnosis: Same and cystocele       Procedures: Procedure(s):  ANTERIOR REPAIR, SUBURETHRAL SLING RETROPUBIC, CYSTOSCOPY     Surgeon: Surgeon(s):  Trevor Johnston MD    Anesthesia: General    Assistant: Surgical Assistant: Cliff Vasquez; Adele Goodwin; Himanshu Davis    Estimated Blood Loss (mL): 125 No values recorded between 9/27/2021 10:21 AM and 9/27/2021 11:21 AM *    IV FLUIDS: 400 milliliter(s) No intake/output data recorded. URINE OUTPUT: 100 milliters(s)   Output by Drain (mL) 09/25/21 0701 - 09/25/21 1500 09/25/21 1501 - 09/25/21 2300 09/25/21 2301 - 09/26/21 0700 09/26/21 0701 - 09/26/21 1500 09/26/21 1501 - 09/26/21 2300 09/26/21 2301 - 09/27/21 0700 09/27/21 0701 - 09/27/21 1121   Patient has no LDAs of requested type attached. Complications: None    Specimens: * No specimens in log *    Implants:   Implant Name Type Inv. Item Serial No.  Lot No. LRB No. Used Action   SYSTEM SLNG NISHA SUPRPUB MID [address] [facility] [id number] N/A 1 Implanted         Drains:   Urethral Catheter Latex 16 fr (Active)       FINDINGS: Stage 2 prolapse. Cystourethroscopy with brisk efflux from the bilateral ureters. No trauma or injury to the bladder. INDICATION FOR PROCEDURE: 38y.o. year old patient who presented with symptomatic pelvic organ prolapse . On exam she had stage 2  prolapse. She desired to proceed with appropriate surgical repairs.   She was consented to the risks, including bleeding, risk of blood transfusion, infection, injury to surrounding organs such as bowel, bladder, ureters, urethra, the risk of postoperative voiding dysfunction or defecatory dysfunction, risk of nerve injury or re-exploration, the risk of recurrent prolapse or incontinence, and the risk of mesh erosion . Patient was counseled regarding prophylactic salpingectomy, reviewed benefits and ACOG recommendations to remove if able and doesn't alter surgical approach. The patient understood all of these risks and desired to proceed. OPERATIVE NOTE:   The patient was taken to the operating room and general anesthesia was found to be adequate. She was prepped and draped in the normal sterile fashion and placed in [address] [place] [address]. Preoperative antibiotics were administered in the patient holding area. ANTERIOR REPAIR:  The patient had a cystocele that was not appreciated on the preop exam.  Because of its size, I decided to repair this at this time. At this time, the cystocele was grasped using Allis clamps and injected with 1% lidocaine with epinephrine. A midline incision was then made with a scalpel and the bladder dissected free from the vaginal mucosa by means of sharp dissection. This dissection was taken out laterally until the inferior pubic rami. The cystocele was then plicated in two layers using 2-0 VicrylExcess vaginal tissue was then excised. RETROPUBIC SUBURETHRAL SLING: The bladder was completely drained using the Tanner catheter. Using the previously made incision, the lateral edges were grasped with Allis clamps. Two tunnels were developed bilaterally until the pubic rami were palpated. Areas on the skin were marked 2 cm lateral to the midline above the symphysis pubis. These areas were injected with 1% lidocaine with epinephrine and incisions were made with a scalpel over the marks. The trocars were then placed bilaterally and brought through the vaginal tunnel with one finger in the vaginal tunnel to guide the trocars. CYSTOURETHROSCOPY: A cystourethroscopy was performed with the trocars in place to confirm no injury to the bladder or urethra. The mesh was attached to the trocars and brought through the tunnels.  The suburethral mesh was placed tension-free in the midurethral segment. The mesh was tensioned using a right angle clamp and Crede maneuver. The plastic sheath was removed. The sling was cut off at the skin. The skin was closed with dermabond. The vaginal incision was closed with 3-0 Vicryl in a running, locked fashion. the Tanner catheter was left in place. Sponge, lap and needle counts were correct x 2. There were no complications. The patient tolerated the procedure well and was taken to the recovery room in stable condition.           Electronically signed by Jamil Wu MD on 9/27/2021 at 11:21 AM

## 2021-09-27 NOTE — ANESTHESIA POSTPROCEDURE EVALUATION
Department of Anesthesiology  Postprocedure Note    Patient: Renzo Pascual  MRN: [id number]  YOB: 1985  Date of evaluation: 9/27/2021  Time:  4:34 PM     Procedure Summary     Date: 09/27/21 Room / Location: [facility]  / [facility]    Anesthesia Start: 1026 Anesthesia Stop: 1130    Procedure: ANTERIOR REPAIR, SUBURETHRAL SLING RETROPUBIC, CYSTOSCOPY (N/A Bladder) Diagnosis:       GSI (genuine stress incontinence), female      (GENUINE STRESS INCONTINENCE)    Surgeons: Khushi Morin MD Responsible Provider: Toma Perez MD    Anesthesia Type: general ASA Status: 2          Anesthesia Type: general    Radha Phase I: Radha Score: 10    Radha Phase II: Radha Score: 10    Last vitals: Reviewed and per EMR flowsheets. Anesthesia Post Evaluation    Patient location during evaluation: PACU  Patient participation: complete - patient participated  Level of consciousness: awake and alert  Airway patency: patent  Nausea & Vomiting: no nausea and no vomiting  Complications: no  Cardiovascular status: hemodynamically stable and blood pressure returned to baseline  Respiratory status: spontaneous ventilation, nonlabored ventilation and room air  Hydration status: stable  Comments: Ms. Knowles Gearing resting comfortably following procedure. Denies any significant pain or nausea. Anticipate transfer to phase II.       Toma Perez MD

## 2021-09-30 ENCOUNTER — TELEPHONE (OUTPATIENT)
Dept: UROGYNECOLOGY | Age: 36
End: 2021-09-30

## 2021-10-14 ENCOUNTER — OFFICE VISIT (OUTPATIENT)
Dept: UROGYNECOLOGY | Age: 36
End: 2021-10-14

## 2021-10-14 VITALS
RESPIRATION RATE: 14 BRPM | HEART RATE: 74 BPM | DIASTOLIC BLOOD PRESSURE: 77 MMHG | SYSTOLIC BLOOD PRESSURE: 117 MMHG | OXYGEN SATURATION: 98 % | TEMPERATURE: 98 F

## 2021-10-14 DIAGNOSIS — Z09 POSTOP CHECK: Primary | ICD-10-CM

## 2021-10-14 PROCEDURE — 99024 POSTOP FOLLOW-UP VISIT: CPT | Performed by: NURSE PRACTITIONER

## 2021-10-14 NOTE — PROGRESS NOTES
10/14/2021       HPI:     Name: Josias Carreno  YOB: 1985    CC: Josias Carreno is a 43 y.o. female who is here for a post op evaluation. HPI: Recently underwent   ANTERIOR REPAIR, SUBURETHRAL SLING RETROPUBIC, CYSTOSCOPY  Voiding difficulty: No  Initiating stream: No  Force stream: No  Lean forward to empty: No  Slow/intermittent stream: No  Unable to empty bladder: No  Incontinence: no   Urgency without incontinence: No   Frequency without incontinence: No   Bowel functions: normal   Pain Controlled: Yes    Past Medical History:   Past Medical History:   Diagnosis Date    Anemia 9/22/2013    Irregular menses 5/13/2013    Postpartum hypertension 5/29/2018    Stress incontinence, female 7/9/2021     Past Surgical History:   Past Surgical History:   Procedure Laterality Date    BREAST SURGERY      URETHRAL SURGERY N/A 9/27/2021    ANTERIOR REPAIR, SUBURETHRAL SLING RETROPUBIC, CYSTOSCOPY performed by Madina Gomez MD at [place] [address]     Current Medications:  Current Outpatient Medications   Medication Sig Dispense Refill    etonogestrel (NEXPLANON) 68 MG implant implant 1  by subdermal route      ibuprofen (IBU) 600 MG tablet Take 1 tablet by mouth every 6 hours as needed for Pain 120 tablet 0    acetaminophen (TYLENOL) 325 MG tablet Take 1,000 mg by mouth once       ibuprofen (ADVIL;MOTRIN) 600 MG tablet Take 1 tablet by mouth three times daily for 7 days 30 tablet 1     No current facility-administered medications for this visit.      Allergies: No Known Allergies  Social History:   Social History     Socioeconomic History    Marital status:      Spouse name: Not on file    Number of children: Not on file    Years of education: Not on file    Highest education level: Not on file   Occupational History    Not on file   Tobacco Use    Smoking status: Never Smoker    Smokeless tobacco: Never Used   Vaping Use    Vaping Use: Never used   Substance and Sexual Activity    Alcohol use: No    Drug use: No    Sexual activity: Yes     Partners: Male   Other Topics Concern    Not on file   Social History Narrative    Not on file     Social Determinants of Health     Financial Resource Strain: Low Risk     Difficulty of Paying Living Expenses: Not hard at all   Food Insecurity: No Food Insecurity    Worried About Running Out of Food in the Last Year: Never true    [address] in the Last Year: Never true   Transportation Needs:     Lack of Transportation (Medical):  Lack of Transportation (Non-Medical):    Physical Activity:     Days of Exercise per Week:     Minutes of Exercise per Session:    Stress:     Feeling of Stress :    Social Connections:     Frequency of Communication with Friends and Family:     Frequency of Social Gatherings with Friends and Family:     Attends Bahai Services:     Active Member of Clubs or Organizations:     Attends Club or Organization Meetings:     Marital Status:    Intimate Partner Violence:     Fear of Current or Ex-Partner:     Emotionally Abused:     Physically Abused:     Sexually Abused:      Family History:   Family History   Problem Relation Age of Onset    Hypertension Father     Asthma Mother     Rachel Gory / Stillbirths Mother     Breast Cancer Paternal Grandmother     Rheum Arthritis Neg Hx     Osteoarthritis Neg Hx     Cancer Neg Hx     Diabetes Neg Hx     Heart Failure Neg Hx     High Cholesterol Neg Hx     Migraines Neg Hx     Ovarian Cancer Neg Hx     Rashes/Skin Problems Neg Hx     Seizures Neg Hx     Stroke Neg Hx     Thyroid Disease Neg Hx      Review of System   Review of Systems   Genitourinary: Positive for vaginal pain. All other systems reviewed and are negative. A review of systems was done by the patient and reviewed by me and scanned into media today.     Objective:     Vitals  Vitals:    10/14/21 1004   BP: 117/77   Pulse: 74   Resp: 14   Temp: 98 °F (36.7 °C)   SpO2: 98% Physical Exam  Physical Exam  Vitals and nursing note reviewed. Constitutional:       Appearance: Normal appearance. HENT:      Head: Normocephalic. Eyes:      Conjunctiva/sclera: Conjunctivae normal.   Cardiovascular:      Rate and Rhythm: Normal rate. Pulmonary:      Effort: Pulmonary effort is normal.   Musculoskeletal:         General: Normal range of motion. Cervical back: Normal range of motion. Skin:     General: Skin is warm and dry. Neurological:      General: No focal deficit present. Mental Status: She is alert and oriented to person, place, and time. Psychiatric:         Mood and Affect: Mood normal.         Behavior: Behavior normal.         Thought Content: Thought content normal.       All surgical incisions healing well. No erythema. No evidence of hematoma or abscess. No results found for this visit on 10/14/21. Assessnent/Plan:     Reji Steinberg is a 43 y.o. female with   1. Postop check        Patient is doing well 2 weeks. She is very pleased with surgical outcomes. Patient is to follow up in 4 weeks . GORDON Solis - CNP      No orders of the defined types were placed in this encounter. No orders of the defined types were placed in this encounter.       Chapito Jordan, GORDON - CNP

## 2021-11-30 ENCOUNTER — OFFICE VISIT (OUTPATIENT)
Dept: UROGYNECOLOGY | Age: 36
End: 2021-11-30

## 2021-11-30 VITALS
RESPIRATION RATE: 14 BRPM | HEART RATE: 78 BPM | TEMPERATURE: 98 F | SYSTOLIC BLOOD PRESSURE: 128 MMHG | OXYGEN SATURATION: 99 % | DIASTOLIC BLOOD PRESSURE: 77 MMHG

## 2021-11-30 DIAGNOSIS — L02.224 BOIL OF GROIN: ICD-10-CM

## 2021-11-30 DIAGNOSIS — Z09 POSTOP CHECK: Primary | ICD-10-CM

## 2021-11-30 PROCEDURE — 99024 POSTOP FOLLOW-UP VISIT: CPT | Performed by: NURSE PRACTITIONER

## 2021-11-30 RX ORDER — CEPHALEXIN 500 MG/1
500 CAPSULE ORAL 2 TIMES DAILY
Qty: 20 CAPSULE | Refills: 0 | Status: SHIPPED | OUTPATIENT
Start: 2021-11-30 | End: 2021-12-10

## 2021-11-30 NOTE — PROGRESS NOTES
11/30/2021     HPI:     Name: Josias Carreno  YOB: 1985    CC: Josias Carreno is a 43 y.o. female who is here for a post op evaluation. HPI: Recently underwent 9/27/21  ANTERIOR REPAIR, SUBURETHRAL SLING RETROPUBIC, CYSTOSCOPY    Voiding difficulty: No  Initiating stream: No  Force stream: No  Lean forward to empty: No  Slow/intermittent stream: No  Unable to empty bladder: No  Incontinence: no   Urgency without incontinence: No   Frequency without incontinence: No   Bowel functions: normal   Pain Controlled: No    Past Medical History:   Past Medical History:   Diagnosis Date    Anemia 9/22/2013    Irregular menses 5/13/2013    Postpartum hypertension 5/29/2018    Stress incontinence, female 7/9/2021     Past Surgical History:   Past Surgical History:   Procedure Laterality Date    BREAST SURGERY      URETHRAL SURGERY N/A 9/27/2021    ANTERIOR REPAIR, SUBURETHRAL SLING RETROPUBIC, CYSTOSCOPY performed by Madina Gomez MD at [place] [address]     Current Medications:  Current Outpatient Medications   Medication Sig Dispense Refill    cephALEXin (KEFLEX) 500 MG capsule Take 1 capsule by mouth 2 times daily for 10 days 20 capsule 0    etonogestrel (NEXPLANON) 68 MG implant implant 1  by subdermal route      ibuprofen (ADVIL;MOTRIN) 600 MG tablet Take 1 tablet by mouth three times daily for 7 days 30 tablet 1    ibuprofen (IBU) 600 MG tablet Take 1 tablet by mouth every 6 hours as needed for Pain (Patient not taking: Reported on 11/30/2021) 120 tablet 0    acetaminophen (TYLENOL) 325 MG tablet Take 1,000 mg by mouth once  (Patient not taking: Reported on 11/30/2021)       No current facility-administered medications for this visit.      Allergies: No Known Allergies  Social History:   Social History     Socioeconomic History    Marital status:      Spouse name: Not on file    Number of children: Not on file    Years of education: Not on file    Highest education level: Not on file   Occupational History    Not on file   Tobacco Use    Smoking status: Never Smoker    Smokeless tobacco: Never Used   Vaping Use    Vaping Use: Never used   Substance and Sexual Activity    Alcohol use: No    Drug use: No    Sexual activity: Yes     Partners: Male   Other Topics Concern    Not on file   Social History Narrative    Not on file     Social Determinants of Health     Financial Resource Strain: Low Risk     Difficulty of Paying Living Expenses: Not hard at all   Food Insecurity: No Food Insecurity    Worried About [facility] in the Last Year: Never true    [facility] in the Last Year: Never true   Transportation Needs:     Lack of Transportation (Medical): Not on file    Lack of Transportation (Non-Medical):  Not on file   Physical Activity:     Days of Exercise per Week: Not on file    Minutes of Exercise per Session: Not on file   Stress:     Feeling of Stress : Not on file   Social Connections:     Frequency of Communication with Friends and Family: Not on file    Frequency of Social Gatherings with Friends and Family: Not on file    Attends Nondenominational Services: Not on file    Active Member of [address] [place] [address] or Organizations: Not on file    Attends Club or Organization Meetings: Not on file    Marital Status: Not on file   Intimate Partner Violence:     Fear of Current or Ex-Partner: Not on file    Emotionally Abused: Not on file    Physically Abused: Not on file    Sexually Abused: Not on file   Housing Stability:     Unable to Pay for Housing in the Last Year: Not on file    Number of [place] in the Last Year: Not on file    Unstable Housing in the Last Year: Not on file     Family History:   Family History   Problem Relation Age of Onset    Hypertension Father     Asthma Mother     Miscarriages / Alivia Alpha Mother     Breast Cancer Paternal Grandmother     Rheum Arthritis Neg Hx     Osteoarthritis Neg Hx     Cancer Neg Hx     Diabetes Neg Hx     Heart Failure Neg Hx     High Cholesterol Neg Hx     Migraines Neg Hx     Ovarian Cancer Neg Hx     Rashes/Skin Problems Neg Hx     Seizures Neg Hx     Stroke Neg Hx     Thyroid Disease Neg Hx      Review of System   Review of Systems   Skin: Positive for wound. All other systems reviewed and are negative. A review of systems was done by the patient and reviewed by me. Objective:     Vitals  Vitals:    11/30/21 0920   BP: 128/77   Pulse: 78   Resp: 14   Temp: 98 °F (36.7 °C)   SpO2: 99%     Physical Exam  Physical Exam  Vitals and nursing note reviewed. Constitutional:       Appearance: Normal appearance. HENT:      Head: Normocephalic. Eyes:      Conjunctiva/sclera: Conjunctivae normal.   Cardiovascular:      Rate and Rhythm: Normal rate. Pulmonary:      Effort: Pulmonary effort is normal.   Genitourinary:     Comments: Small boil on mons, tender to patient. Musculoskeletal:         General: Normal range of motion. Cervical back: Normal range of motion. Skin:     General: Skin is warm and dry. Neurological:      General: No focal deficit present. Mental Status: She is alert. Psychiatric:         Mood and Affect: Mood normal.         Behavior: Behavior normal.       All surgical incisions healing well. No erythema. No evidence of hematoma or abscess. No results found for this visit on 11/30/21. Assessment/Plan:     Louie Garcia is a 43 y.o. female with   1. Boil of groin    2. Postop check        Patient is doing well 6 weeks. Patient is to follow up prn. Restrictions lifted  Keflex for Boil. If no resolution, will see GYN to have lanced as she has before. Michele Beltran, APRN - CNP    No orders of the defined types were placed in this encounter.     Orders Placed This Encounter   Medications    cephALEXin (KEFLEX) 500 MG capsule     Sig: Take 1 capsule by mouth 2 times daily for 10 days     Dispense:  20 capsule     Refill:  0731 Shiva Figueroa APRN - CNP

## 2022-03-02 ENCOUNTER — TELEPHONE (OUTPATIENT)
Dept: INTERNAL MEDICINE CLINIC | Age: 37
End: 2022-03-02

## 2022-03-02 ENCOUNTER — OFFICE VISIT (OUTPATIENT)
Dept: INTERNAL MEDICINE CLINIC | Age: 37
End: 2022-03-02
Payer: COMMERCIAL

## 2022-03-02 VITALS
HEART RATE: 78 BPM | TEMPERATURE: 96.8 F | OXYGEN SATURATION: 97 % | DIASTOLIC BLOOD PRESSURE: 70 MMHG | WEIGHT: 174.6 LBS | SYSTOLIC BLOOD PRESSURE: 125 MMHG | BODY MASS INDEX: 29.09 KG/M2 | HEIGHT: 65 IN

## 2022-03-02 DIAGNOSIS — M25.531 RIGHT WRIST PAIN: ICD-10-CM

## 2022-03-02 DIAGNOSIS — L02.92 BOIL: Primary | ICD-10-CM

## 2022-03-02 DIAGNOSIS — M25.551 RIGHT HIP PAIN: ICD-10-CM

## 2022-03-02 DIAGNOSIS — G89.29 CHRONIC PAIN OF RIGHT KNEE: ICD-10-CM

## 2022-03-02 DIAGNOSIS — M25.561 CHRONIC PAIN OF RIGHT KNEE: ICD-10-CM

## 2022-03-02 PROCEDURE — 99214 OFFICE O/P EST MOD 30 MIN: CPT | Performed by: INTERNAL MEDICINE

## 2022-03-02 RX ORDER — SULFAMETHOXAZOLE AND TRIMETHOPRIM 800; 160 MG/1; MG/1
1 TABLET ORAL 2 TIMES DAILY
Qty: 14 TABLET | Refills: 0 | Status: SHIPPED | OUTPATIENT
Start: 2022-03-02 | End: 2022-03-09

## 2022-03-02 RX ORDER — MELOXICAM 7.5 MG/1
7.5 TABLET ORAL DAILY PRN
Qty: 30 TABLET | Refills: 0 | Status: SHIPPED | OUTPATIENT
Start: 2022-03-02 | End: 2022-04-05

## 2022-03-02 ASSESSMENT — PATIENT HEALTH QUESTIONNAIRE - PHQ9
2. FEELING DOWN, DEPRESSED OR HOPELESS: 0
SUM OF ALL RESPONSES TO PHQ QUESTIONS 1-9: 0
1. LITTLE INTEREST OR PLEASURE IN DOING THINGS: 0
SUM OF ALL RESPONSES TO PHQ QUESTIONS 1-9: 0
SUM OF ALL RESPONSES TO PHQ9 QUESTIONS 1 & 2: 0

## 2022-03-02 NOTE — PATIENT INSTRUCTIONS
[facility] Locations - No appointment necessary. @ indicates the location is open Saturdays in addition to Monday through Friday. Call your preferred location for test preparation, business hours and other information you need. [facility] accepts [employer]. [facility]    @ [place] [facility] [address] [facility] [address] [place] [address]. [place], [facility] [address]   Ph: [phone] [place] [facility] [address]   Ph: [phone]  @ [facility] [place] [facility] [address] [facility]   Ph: [phone]    [facility] [address]   Ph: [phone] @ [place] [facility] [address]  Ph: [phone] @ [facility] Svcs[facility] [address] [place] [address]   Ph: [phone]    [place]   @ formerly [facility] [address] [place], [place] [address]   Ph: [phone] [facility] [address]  Ph: [address] [place] [phone], [phone]   [facility] [address] Lab Svcs[address] [facility]   Ph: [phone] [facility]. Lab Svcs.   [address]   Ph: [phone]

## 2022-03-02 NOTE — TELEPHONE ENCOUNTER
----- Message from Victorina Guard sent at 3/2/2022  9:14 AM EST -----  Subject: Referral Request    QUESTIONS   Reason for referral request? dermatologist.   Has the physician seen you for this condition before? No   Preferred Specialist (if applicable)? Do you already have an appointment scheduled? No  Additional Information for Provider?   ---------------------------------------------------------------------------  --------------  CALL BACK INFO  What is the best way for the office to contact you? OK to leave message on   voicemail  Preferred Call Back Phone Number?  [phone]

## 2022-03-02 NOTE — PROGRESS NOTES
Patient: Pavel Ricardo is a 43 y.o. female who presents today with the following Chief Complaint(s):    Chief Complaint   Patient presents with    Referral - General     Dermatologist     Joint Pain     knee    Hip Pain    Other     boil          HPI She complains of joint pain. Specifically right wrist, hip and knee. Pain with walking. She does a lot of typing at work. No injuries. Ibuprofen helps in discomfort in these areas. Complains of right groin boil. Sensitive to touch. Some drainage. Present for several weeks. Current Outpatient Medications   Medication Sig Dispense Refill    ibuprofen (ADVIL;MOTRIN) 600 MG tablet Take 1 tablet by mouth three times daily for 7 days 30 tablet 1    etonogestrel (NEXPLANON) 68 MG implant implant 1  by subdermal route      ibuprofen (IBU) 600 MG tablet Take 1 tablet by mouth every 6 hours as needed for Pain (Patient not taking: Reported on 11/30/2021) 120 tablet 0    acetaminophen (TYLENOL) 325 MG tablet Take 1,000 mg by mouth once  (Patient not taking: Reported on 11/30/2021)       No current facility-administered medications for this visit. Patient's past medical history, surgical history, family history, medications,and allergies  were all reviewed and updated as appropriate today. Review of Systems   Constitutional: Negative. HENT: Negative. Eyes: Negative. Respiratory: Negative. Cardiovascular: Negative. Gastrointestinal: Negative. Genitourinary:        Right groin boil. See HPI. Musculoskeletal: Positive for arthralgias. Negative for joint swelling. See HPI. Skin: Negative. Neurological: Negative. Psychiatric/Behavioral: Negative. Physical Exam  Constitutional:       General: She is not in acute distress. Appearance: She is well-developed. HENT:      Head: Normocephalic and atraumatic.       Right Ear: External ear normal.      Left Ear: External ear normal.      Nose: Nose normal. Eyes:      General: No scleral icterus. Conjunctiva/sclera: Conjunctivae normal.      Pupils: Pupils are equal, round, and reactive to light. Neck:      Thyroid: No thyromegaly. Cardiovascular:      Rate and Rhythm: Normal rate and regular rhythm. Heart sounds: Normal heart sounds. Pulmonary:      Effort: Pulmonary effort is normal.      Breath sounds: Normal breath sounds. Abdominal:      General: Bowel sounds are normal.      Palpations: Abdomen is soft. There is no mass. Genitourinary:     Comments: Right groin 1/2 boil. Scant expressible purulence. Tender to palpate. Musculoskeletal:      Cervical back: Normal range of motion and neck supple. Comments: Ulnar aspect right wrist tenderness with stretching joint. Tenderness at right patellar-femoral joint to palpate. Lymphadenopathy:      Cervical: No cervical adenopathy. Skin:     General: Skin is warm and dry. Neurological:      Mental Status: She is alert and oriented to person, place, and time. Deep Tendon Reflexes: Reflexes are normal and symmetric. Psychiatric:         Behavior: Behavior normal.         Thought Content: Thought content normal.         Judgment: Judgment normal.         Vitals:    03/02/22 1208   BP: 125/70   Pulse: 78   Temp: 96.8 °F (36 °C)   SpO2: 97%       Assessment:   Diagnosis Orders   1. Boil  Warm soaks suggested. External Referral To Dermatology    sulfamethoxazole-trimethoprim (BACTRIM DS;SEPTRA DS) 800-160 MG per tablet   2. Right wrist pain  Suspect tendonitis. Gopal Mclain MD, Hand Surgery (Hand, Wrist, Upper Extremity), [facility]   3. Chronic pain of right knee  XR KNEE RIGHT (3 VIEWS)  Suspect arthritis. Exercises, diet, supplements, NSAIDS, prn.    4. Right hip pain  Suspect arthritis. Treatment as above. Plan:  See plans above.

## 2022-03-28 ASSESSMENT — ENCOUNTER SYMPTOMS
EYES NEGATIVE: 1
GASTROINTESTINAL NEGATIVE: 1
RESPIRATORY NEGATIVE: 1

## 2022-04-05 RX ORDER — MELOXICAM 7.5 MG/1
TABLET ORAL
Qty: 30 TABLET | Refills: 0 | Status: SHIPPED | OUTPATIENT
Start: 2022-04-05

## 2022-04-11 ENCOUNTER — OFFICE VISIT (OUTPATIENT)
Dept: ORTHOPEDIC SURGERY | Age: 37
End: 2022-04-11
Payer: COMMERCIAL

## 2022-04-11 VITALS — RESPIRATION RATE: 16 BRPM

## 2022-04-11 DIAGNOSIS — M79.601 RIGHT ARM PAIN: ICD-10-CM

## 2022-04-11 DIAGNOSIS — M25.531 RIGHT WRIST PAIN: Primary | ICD-10-CM

## 2022-04-11 PROCEDURE — 99203 OFFICE O/P NEW LOW 30 MIN: CPT | Performed by: PHYSICIAN ASSISTANT

## 2022-04-11 NOTE — PROGRESS NOTES
Ms. Guille Finn is a 43 y.o. right handed woman  who is seen today in Hand Surgical Consultation at the request of Clifford Richardson MD.    She presents today regarding right wrist symptoms which have been present for approximately 6 months. A history of antecedent trauma or injury is Absent. She reports symptoms to include moderate pain along the  right  Ulnar wrist and distal forearm. Wrist symptoms are worse with certain twisting or gripping activities. Symptoms are Daily worse with use. She reports mild pain with forearm supination. Symptoms are worsening over time. Previous treatment has included conservative measures and splinting of the right wrist.  She does not claim relation of her symptoms to her required work activities. She has not undergone any form of testing. I have today reviewed with Guille Finn the clinically relevant, past medical history, medications, allergies,  family history, social history, and Review Of Systems & I have documented any details relevant to today's presenting complaints in my history above. Ms. Fatemeh Larkin's self-reported past medical history, medications, allergies,  family history, social history, and Review Of Systems have been scanned into the chart under the \"Media\" tab. Physical Exam:  Ms. Riddhi Ryan most recent vitals:  Vitals  Resp: 16    She is well nourished, oriented to person, place & time. She demonstrates appropriate mood and affect as well as normal gait and station. Skin: Normal in appearance, Normal Color and Free of Lesions Bilaterally   Digital range of motion is Full bilaterally. Wrist range of motion is limited by pain with supination. Pain with palpation along ECU tendon. Pain with palpation at the base of the 5th metacarpal and along the ulnar head. There is no evidence of gross joint instability bilaterally.   Sensation is subjectively burning in the upper forearm and subjectively normal in the Median, Ulnar and Radial Sensory distribution on the Right, normal on the Left  Vascular examination reveals normal, good capillary refill and good color bilaterally   Swelling is mild along the ulnar margin of the wrist on the Right, normal on the Left  Muscular strength is clinically appropriate bilaterally. Examination of the right sixth dorsal extensor compartment of the wrist demonstrates moderate pain with palpation. Wrist range of motion is accompanied by mild pain with supination. Maximal pain is elicited with palpation of the Ulnar aspect of the wrist. Specifically, the Ulnar Styloid is mildly tender to palpation, the dorsal margin of the TFCC is not tender to palpation, the DRUJ is mildly tender to palpation and without effusion or instability, the Ulnar Intra-Carpal joints are not tender to palpation and without instability, the ECU tendon is moderately tender to palpation and is not subluxing from it's sub-sheath. Ulnar sided pain is not exacerbated with maximal wrist Ulnar Deviation and is unchanged in full supination, unchanged in neutral rotation, and is unchanged in full pronation. Radiographic Evaluation:  Radiographs, taken In My Office were Personally Reviewed & Interpreted by myself today (3 views of the right wrist). They demonstrate no evidence of an acute fracture of the distal radius, ulna, or carpal bones. There is no widening of the scapho-lunate interval &  no flexion of the scaphoid. There is no evidence of degenerative change, all other aspects of the carpus are relatively spared. There is not evidence of other injury or bony fracture. Impression:  Ms. Kerry Cox has developed ECU Tendonitis, which is currently exacerbated, and presents requesting further treatment. Plan:  I have had a thorough discussion with Ms. Talia Larkin regarding the treatment options available for her initially presenting right ECU Tendonitis, which is causing her significant symptoms and difficulty. I have outlined for Ms. Talia Larkin the risk, benefits and consequences of the various treatment modalities, including a reasonable expectation for the long term success of each. We have discussed the likelihood that further, more aggressive treatment may be required for her current presenting condition. Based upon our current discussion and a reasonable understating of the options available to her, Ms. Milagros Dubois has selected to proceed with a conservative plan of treatment consisting of: the use of protective splints, activity modification, and the judicious use of over-the-counter anti-inflammatory medications if allowed by her primary care physician. An appropriately sized Removable forearm based Wrist orthosis has been previously provided. Instructions were given regarding splint use and wear as well as suggestions for use of the other modalities were discussed. I have clearly explained to her that the above outlined treatment plan should not be expected to 'cure' her  ECU Tendonitis, but we are rather treating the symptoms with which she presents. She has understood that in order to achieve more durable relief of her symptoms and to prevent future worsening or further damage, that definitive surgical treatment would be required. Ms. Milagros Dubois  voiced an appropriate understanding of our discussion, the options available to her, and of the expectations of her selected  treatment. We discussed the option of pursuing formalized hand therapy and a prescription was provided. I have also discussed with Ms. Milagros Dubois  the other treatment options available to her  for this condition. We have today selected to proceed with conservative management.   She and I have agreed that if our current course of conservative treatment does not prove to be effective over the short term future, that she will schedule a follow-up appointment to discuss and select an alternate course of therapy including possibly injection or surgical treatment. Ms. Bud Palacios has been given a full verbal list of instructions and precautions related to her present condition. I have asked her to followup with me in the office at the prescribed time. She is also specifically requested to call or return to the office sooner if her symptoms change or worsen prior to the next scheduled appointment.

## 2022-04-15 ENCOUNTER — HOSPITAL ENCOUNTER (OUTPATIENT)
Dept: OCCUPATIONAL THERAPY | Age: 37
Setting detail: THERAPIES SERIES
Discharge: HOME | End: 2022-04-15
Payer: COMMERCIAL

## 2022-04-15 PROCEDURE — 97110 THERAPEUTIC EXERCISES: CPT | Performed by: OCCUPATIONAL THERAPIST

## 2022-04-15 PROCEDURE — 97165 OT EVAL LOW COMPLEX 30 MIN: CPT | Performed by: OCCUPATIONAL THERAPIST

## 2022-04-15 PROCEDURE — 97018 PARAFFIN BATH THERAPY: CPT | Performed by: OCCUPATIONAL THERAPIST

## 2022-04-15 NOTE — FLOWSHEET NOTE
[address] [facility], [address] [place] [address] [facility] [place]  Phone: [phone]  Fax [phone]    Occupational Therapy Treatment Note/ Progress Report:     Is this a Progress Report:     []  Yes  [x]  No      If Yes:  Date Range for reporting period:  Beginning 4/15/22  Ending 4/15/2022    Date:  4/15/2022  Patient Name:  Gen Canales    :  1985  MRN: [id number]  Medical/Treatment Diagnosis Information:  · Diagnosis: R wrist pain R wrist ECU tendinitis R wrist stiffness M25.631       Insurance/Certification information:   humana   Physician Information:  Referring Practitioner: Dr. Marilyn Gaming  Has the plan of care been signed (Y/N):        []  Yes  [x]  No     Visit # Insurance Allowable Auth Required   1  []  Yes []  No    From 4/15/22  to 4/15/2022    Comorbidities Affecting Functional Performance:     []Anxiety (F41.9)/Depression (F32.9)   []Diabetes Type 1(E10.65) or 2 (E11.65)   []Rheumatoid Arthritis (M05.9)  []Fibromyalgia (M79.7)  []Neuropathy(G60.9)  []Osteoarthritis(M19.91)  []None   [x]Other:  Date of Injury: progressive onset over the last 6 mo. Date of Surgery: na     Date of Patient follow up with Physician:      RESTRICTIONS/PRECAUTIONS: none      Latex Allergy:  [x]? No      []? Yes                    Pacemaker:  [x]? No       []? Yes      Preferred Language for Healthcare:   [x]? English       []? other:      Functional Scale: 39% disability (Quick DASH)                            Date assessed:  4/15/2022     C-SSRS Triggered by Intake questionnaire (Past 2 wk assessment):    No, Questionnaire did not trigger screening.      SUBJECTIVE: Patient reported deficits/history of current problem: progressive onset      Pain Scale: 6/10 with use in wrist, also reports pain up the forearm and lateral elbow        [x]? 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 Patient reported history, allergies, and medications reviewed - see intake form.        Comorbidities Affecting Functional Performance:             []?Anxiety (F41.9)/Depression (F32.9)           []? Hypertension  []? Diabetes Type 1(E10.65) or 2 (E11.65)     []? CVA   []? Rheumatoid Arthritis (M05.9)                     []?Aherisclerosis  []? Fibromyalgia (M79.7)                                 []?Angina Pectoris  []? Neuropathy(G60.9)                                    []?Disc Pathology  []? Osteoarthritis(M19.91)                               []?Osteoporosis  [x]? None                                                           []?Morbid Obesity  []? Other:                                                         []?COPD     OBJECTIVE:   Date:  Hand Dominance:     [x]? Right    []? Left 4/15/2022      Objective Measures:     PAIN 6/10   Quick DASH 39%                           Digits tip to DPFC in cm Index:  Long:  Ring:  Small:   Thumb ROM MP  IP   Thumb opposition  R:  L:   Thumb Radial/Palmar abd ROM R:  L:   Wrist ROM Ext/Flex R: 76/59  L: 75/75   Rad/Uln dev ROM R: 16/28  L: 20/32   Forearm ROM  Sup/pron R:  L:   Elbow ROM Ext/flex R:  L:   Shoulder Flex  Shoulder Abd  Shoulder IR/ER WNL   Edema in cm circumf. MCPJs R:  L:   Edema in cm circumf.   Wrist R: 16.2  L: 16.0    strength in lbs R: 25  L: 61.7   Pinch Strengthin lbs: lat  R:  L:   Pinch Strength in lbs:  3 point R:  L:      MMT:      Observations:  (including splints, bandages, incisions, scars):           MODALITIES: 4/15/22      Fluidotherapy (81808)      Estim (08516/50469)      Paraffin (78624) 10'     US (21978)      Iontophoresis (15431)      Hot Pack      Cold Pack            INTERVENTIONS:      Therapeutic Exercise (19128) Issued HEP see media file                              Therapeutic Activity (45470)                  Manual Therapy (05649)      (IAS, Dry Needling, manual mobilization)            Neuromuscular Reeducation (69024) Education and cues for ex. Fabricated ulnar side wrist support neoprene    k tape ulnar sided wrist                  ADL Training (38870)                  HEP Training/Review See sheet(s)                 Splinting      Lcode:      Orthotic Mgmt, Subsequent Enc (20672)      Orthotic Mgmt & Training (09971)            Other:        Therapeutic Exercise & NMR:  [x] (33121) Provided verbal/tactile cueing for activities related to strengthening, flexibility, endurance, ROM  for improvements in scapular, scapulothoracic and UE control with self care, reaching, carrying, lifting, house/yardwork, driving/computer work.    [] (46654) Provided verbal/tactile cueing for activities related to improving balance, coordination, kinesthetic sense, posture, motor skill, proprioception  to assist with  scapular, scapulothoracic and UE control with self care, reaching, carrying, lifting, house/yardwork, driving/computer work.     Therapeutic Activities & NMR:    [] (98816 or 11149) Provided verbal/tactile cueing for activities related to improving balance, coordination, kinesthetic sense, posture, motor skill, proprioception and motor activation to allow for proper function of scapular, scapulothoracic and UE control with self care, carrying, lifting, driving/computer work    Home Exercise Program:    [x] (67626) Reviewed/Progressed HEP activities related to strengthening, flexibility, endurance, ROM of scapular, scapulothoracic and UE control with self care, reaching, carrying, lifting, house/yardwork, driving/computer work  [] (28940) Reviewed/Progressed HEP activities related to improving balance, coordination, kinesthetic sense, posture, motor skill, proprioception of scapular, scapulothoracic and UE control with self care, reaching, carrying, lifting, house/yardwork, driving/computer work      Manual Treatments:  PROM / STM / Oscillations-Mobs: G-I, II, III, IV (PA's, Inf., Post.)  [] (13292) Provided manual therapy to mobilize soft tissue/joints of cervical/CT, scapular GHJ and UE for the purpose of modulating pain, promoting relaxation,  increasing ROM, reducing/eliminating soft tissue swelling/inflammation/restriction, improving soft tissue extensibility and allowing for proper ROM for normal function with self care, reaching, carrying, lifting, house/yardwork, driving/computer work    ADL Training:  [] (03938) Provided self-care/home management training related to activities of daily living and compensatory training, and/or use of adaptive equipment      Charges:  Timed Code Treatment Minutes: 10   Total Treatment Minutes: 40   Worker's Comp: Time In/Time Out     [x] EVAL (LOW) 05680 (typically 20 minutes face-to-face)    [] EVAL (MOD) 45002 (typically 30 minutes face-to-face)  [] EVAL (HIGH) 81150 (typically 45 minutes face-to-face)  [] OT Re-eval (58774)       [x] Isai [phone] x      [] GEZAB(91178)  [] NMR (22032) x      [] Estim (attended) (90422)   [] Manual ([phone]) x      [] US (87130)  [] TA (09700) x      [x] Paraffin (85090)  [] ADL  (73123) x     [] Splint/L code:    [] Estim (unattended) ([phone])  [] Fluidotherapy (23174)  [] DN 1-2 (74040)   [] DN 3+ (88080)  [] Orthotic Mgmt, Subsequent Enc (53550)  [] Orthotic Mgmt & Training (05939)  [] Other:    GOALS:  Patient stated goal: better range of motion    []? Progressing: []? Met: []? Not Met: []? Adjusted     Therapist goals for Patient:   Short Term Goals: To be achieved in: 2 weeks  1. Independent in HEP and progression per patient tolerance, in order to prevent re-injury. []? Progressing: []? Met: []? Not Met: []? Adjusted   2. Patient will have a decrease in pain to facilitate improvement in movement, function, and ADLs as indicated by Functional Deficits. []? Progressing: []? Met: []? Not Met: []?  Adjusted     Long Term Goals to be achieved in 8 weeks (through 6/15/22), including patient directed goals to address patient identified performance deficits:  1) Pt to be independent in graded HEP progression with a good level of effort and compliance. []? Progressing: []? Met: []? Not Met: []? Adjusted   2) Pt to report a score of </= 25 % on the Quick DASH disability questionnaire for increased performance with carrying, moving, and handling objects. []? Progressing: []? Met: []? Not Met: []? Adjusted   3) Pt will demonstrate increased ROM to R wrist to = L for improved independence with grooming and hair. []? Progressing: []? Met: []? Not Met: []? Adjusted   4) Pt will demonstrate increased strength to R  to 50# for improved independence with opening things. []? Progressing: []? Met: []? Not Met: []? Adjusted   5) Pt will have a decrease in pain to 0-2/10 to facilitate changing diapers. []? Progressing: []? Met: []? Not Met: []? Adjusted          Overall Progression Towards Functional Goals/Treatment Progress Update:  [] Patient is progressing as expected towards functional goals listed. [] Progression is slowed due to complexities/impairments listed. [] Progression has been slowed due to co-morbidities.   [x] Plan just implemented, too soon to assess goals progression <30 days  [] Goals require adjustment due to lack of progress  [] Patient is not progressing as expected and requires additional follow up with physician  [] All goals are met  [] Other:     Prognosis for POC: [x] Good [] Fair  [] Poor    Patient requires continued skilled intervention: [x] Yes  [] No    Treatment/Activity Tolerance:  [x] Patient able to complete treatment  [] Patient limited by fatigue  [] Patient limited by pain    [] Patient limited by other medical complications  [] Other:                  PLAN: See eval  [] Continue per plan of care [] Alter current plan (see comments above)  [x] Plan of care initiated [] Hold pending MD visit [] Discharge    Electronically signed by:  Ludin Howard, OT, OTR\L, 1874      Note: If patient does not return for scheduled/ recommended follow up visits, this note will serve as a discharge from care along with most recent update on progress.

## 2022-04-15 NOTE — PLAN OF CARE
[facility], [address]  Phone: [phone]  Fax [phone]    Occupational Therapy/Hand Therapy Certification  Dear Referring Practitioner: Dr. Shauna Severs    We had the pleasure of evaluating the following patient for occupational therapy services at [address] [place] [address]. A summary of our findings can be found in the initial assessment below. This includes our plan of care. If you have any questions or concerns regarding these findings, please do not hesitate to contact me at the office phone number checked above. Thank you for the referral.     Physician Signature:_______________________________Date:__________________  By signing above (or electronic signature), therapists plan is approved by physician      Patient: Paco Rosas   : 1985   MRN: [id number]  Referring Physician: Referring Practitioner: Dr. Shauna Severs      Evaluation Date: 4/15/2022      Medical Diagnosis Information:  Diagnosis: R wrist pain R wrist ECU tendinitis R wrist stiffness M25.631                                             Insurance information:  [place] [id number]     Date of Injury: progressive onset over the last 6 mo. Date of Surgery: na    Date of Patient follow up with Physician:     RESTRICTIONS/PRECAUTIONS: none     Latex Allergy:  [x]No      []Yes  Pacemaker:  [x] No       [] Yes     Preferred Language for Healthcare:   [x]English       []other:     Functional Scale: 39% disability (Quick DASH)   Date assessed:  4/15/2022    C-SSRS Triggered by Intake questionnaire (Past 2 wk assessment):    No, Questionnaire did not trigger screening.       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 Comorbidities Affecting Functional Performance:     []Anxiety (F41.9)/Depression (F32.9) []Hypertension  []Diabetes Type 1(E10.65) or 2 (E11.65)     []CVA   []Rheumatoid Arthritis (M05.9)                     []Aherisclerosis  []Fibromyalgia (M79.7)                                 []Angina Pectoris  []Neuropathy(G60.9)                                    []Disc Pathology  []Osteoarthritis(M19.91)                               []Osteoporosis  [x]None                                                           []Morbid Obesity  []Other:                                                         []COPD    OBJECTIVE:   Date:  Hand Dominance:     [x]  Right    [] Left 4/15/2022     Objective Measures:    PAIN 6/10   Quick DASH 39%                   Digits tip to DPFC in cm Index:  Long:  Ring:  Small:   Thumb ROM MP  IP   Thumb opposition  R:  L:   Thumb Radial/Palmar abd ROM R:  L:   Wrist ROM Ext/Flex R: 76/59  L: 75/75   Rad/Uln dev ROM R: 16/28  L: 20/32   Forearm ROM  Sup/pron R:  L:   Elbow ROM Ext/flex R:  L:   Shoulder Flex  Shoulder Abd  Shoulder IR/ER WNL   Edema in cm circumf. MCPJs R:  L:   Edema in cm circumf.   Wrist R: 16.2  L: 16.0    strength in lbs R: 25  L: 61.7   Pinch Strengthin lbs: lat  R:  L:   Pinch Strength in lbs:  3 point R:  L:     MMT:     Observations:  (including splints, bandages, incisions, scars):    Sensation:  [x] No reported deficits  [] Intact to light touch    [] Lima Fan test completed, findings as noted:  [] Other:    Palpation: point tender over ECU and lateral epicondyle     Occupational Profile:  Home Enviroment: lives with  [x] spouse,  [] family,  [] alone,  [] significant other,   [x] other: 33,5 and 9year old kids     Occupation/School: , typing a lot     Recreational Activities/Meaningful Interests: sing, holding brandon hurts    Prior Level of Function: [x] Independent with ADLs/IADLs     [] Assistance needed (describe):    Patient-Identified Primary Performance Deficits (to be addressed in POC):   [x] bathing    [x] household tasks (cooking/cleaning)   [] dressing    [] self feeding   [x] grooming    [x] work/education   [] functional mobility   [] sleeping/rest   [] toileting/hygiene   [] recreational activities   [] driving    [] community/social participation   [] other:     Functional Mobility/Transfers/Gait:  [x] Independent - no significant gait deviations  [] Assistance needed   [] Assistive device used: Falls Risk Assessment (30 days):   [x] Falls Risk assessed and no intervention required. [] Falls Risk assessed and Patient requires intervention due to being higher risk   TUG score (>12s at risk):     [] Falls education provided, including      Review Of Systems (ROS): [x]Performed Review of systems (Integumentary, CardioPulmonary, Neurological) by intake and observation. Intake form has been scanned into medical record. Patient has been instructed to contact their primary care physician regarding ROS issues if not already being addressed at this time. ASSESSMENT:   This patient presents with signs and symptoms consistent with the medical diagnosis provided by the referring physician.      Impairments (physical, cognitive and/or psychosocial):  [x] Decreased mobility   [x] Weakness    [x] Hypersensitivity   [x] Pain/tenderness   [] Edema/swelling   [] Decreased coordination (fine/gross motor)   [] Impaired body mechanics  [] Sensory loss  [] Loss of balance   [] Other:        Rehab Potential:   [x] Excellent [] Good [] Fair  [] Poor     Barriers affecting rehab potential:  []Age    []Lack of Motivation             []Co-Morbidities  []Cognitive Function  []Environmental/home/work barriers   []Professional/work barriers  []Other:                                  []Smoker    Tolerance of evaluation/treatment:    [x] Excellent [] Good [] Fair  [] Poor    PLAN OF CARE:  Interventions:   [x] Therapeutic Exercise [x] Therapeutic Activity    [x] Activities of Daily Living [x] Neuromuscular Re-education      [x] Patient Education  [x] Manual Therapy      [x] Modalities as needed, and not otherwise contraindicated, including: ultrasound,paraffin,moist heat/cold pack, electrical stimulation, contrast bath, iontophoresis  [] Splinting    Frequency/Duration:  1 days per week for 8 weeks      GOALS:  Patient stated goal: better range of motion    [] Progressing: [] Met: [] Not Met: [] Adjusted    Therapist goals for Patient:   Short Term Goals: To be achieved in: 2 weeks  1. Independent in HEP and progression per patient tolerance, in order to prevent re-injury. [] Progressing: [] Met: [] Not Met: [] Adjusted   2. Patient will have a decrease in pain to facilitate improvement in movement, function, and ADLs as indicated by Functional Deficits. [] Progressing: [] Met: [] Not Met: [] Adjusted    Long Term Goals to be achieved in 8 weeks (through 6/15/22), including patient directed goals to address patient identified performance deficits:  1) Pt to be independent in graded HEP progression with a good level of effort and compliance. [] Progressing: [] Met: [] Not Met: [] Adjusted   2) Pt to report a score of </= 25 % on the Quick DASH disability questionnaire for increased performance with carrying, moving, and handling objects. [] Progressing: [] Met: [] Not Met: [] Adjusted   3) Pt will demonstrate increased ROM to R wrist to = L for improved independence with grooming and hair. [] Progressing: [] Met: [] Not Met: [] Adjusted   4) Pt will demonstrate increased strength to R  to 50# for improved independence with opening things. [] Progressing: [] Met: [] Not Met: [] Adjusted   5) Pt will have a decrease in pain to 0-2/10 to facilitate changing diapers.   [] Progressing: [] Met: [] Not Met: [] Adjusted        OCCUPATIONAL THERAPY EVALUATION COMPLEXITY JUSTIFICATION:    [x] An occupational profile and medical/therapy history, which includes:   [x] a brief history including medical and/or therapy records relating to the     presenting problem   [] an expanded review of medical and/or therapy records and additional review     of physical, cognitive or psychosocial history related to current functional    performance   [] an extensive additional review of review of medical and/or therapy records   and physical, cognitive, or psychosocial history related to current    functional performance    [x] An assessment that identifies performance deficits (relating to physical, cognitive, or psychosocial skills) that result in activity limitations and/or participation restrictions:   [x] 1-3 performance deficits   [] 3-5 performance deficits   [] 5 or more performance deficits    [x] Clinical decision making of:   [x] low complexity, including analysis of occupational profile, data analysis from problem focused assessment, and consideration of a limited number of treatment options. No comorbidities affect occupational performance. No task modifications or assistance needed to complete evaluation. [] moderate complexity, including analysis of occupational profile, data analysis from detailed assessment and consideration of several treatment options. Comorbidities that affect occupational performance may be present. Minimal to moderate task modifications or assistance needed to complete assessment. [] high complexity, including analysis of occupational profile, analysis of data from comprehensive assessment and consideration of multiple treatment options. Multiple comorbidities present that affect occupational performance. Significant task modifications or assistance needed to complete assessment.     Evaluation Code:  [x] Low Complexity EVAL 84193 (typically 30 minutes face to face)  [] Mod Complexity EVAL 72543 (typically 45 minutes face to face)  [] High Complexity EVAL 48921 (typically 60 minutes face to face)    Electronically signed by:  Monroe Lemus  , [address], 8796

## 2022-04-28 ENCOUNTER — HOSPITAL ENCOUNTER (OUTPATIENT)
Dept: OCCUPATIONAL THERAPY | Age: 37
Setting detail: THERAPIES SERIES
Discharge: HOME | End: 2022-04-28
Payer: COMMERCIAL

## 2022-04-28 NOTE — FLOWSHEET NOTE
[facility],  [address]  Phone: [phone]  Fax [phone]      Occupational Therapy  Cancellation/No-show Note  Patient Name:  Alicja Perales   :  1985   Date:  2022  Cancelled visits to date:   No-shows to date: 1    For today's appointment patient:  []    Cancelled  []    Rescheduled appointment  [x]    No-show     Reason given by patient:  []    Patient ill  []    Conflicting appointment  []    No transportation    []    Conflict with work  [x]    No reason given  []    Other:     Comments:      Electronically signed by:  Jordan Walters OT

## 2022-05-05 ENCOUNTER — HOSPITAL ENCOUNTER (OUTPATIENT)
Dept: OCCUPATIONAL THERAPY | Age: 37
Setting detail: THERAPIES SERIES
Discharge: HOME | End: 2022-05-05

## 2022-05-05 NOTE — FLOWSHEET NOTE
[facility],  [address] [place]  Phone: [phone]  Fax [phone]      Occupational Therapy  Cancellation/No-show Note  Patient Name:  Leta Gordon   :  1985   Date:  2022  Cancelled visits to date: 0  No-shows to date: 2    For today's appointment patient:  []    Cancelled  []    Rescheduled appointment  [x]    No-show     Reason given by patient:  []    Patient ill  []    Conflicting appointment  []    No transportation    []    Conflict with work  [x]    No reason given  []    Other:     Comments:      Electronically signed by:  Hillary Fall OT

## 2022-05-12 ENCOUNTER — HOSPITAL ENCOUNTER (OUTPATIENT)
Dept: OCCUPATIONAL THERAPY | Age: 37
Setting detail: THERAPIES SERIES
Discharge: HOME | End: 2022-05-12

## 2022-05-12 NOTE — FLOWSHEET NOTE
[facility], [address]  Phone: [phone]  Fax [phone]      Occupational Therapy  Cancellation/No-show Note  Patient Name:  Tammy Roman   :  1985   Date:  2022  Cancelled visits to date: 0  No-shows to date: 3    For today's appointment patient:  []    Cancelled  []    Rescheduled appointment  [x]    No-show     Reason given by patient:  []    Patient ill  []    Conflicting appointment  []    No transportation    []    Conflict with work  []    No reason given  []    Other: No other appts currently scheduled; pt will need to see doctor before returning for therapy per no show policy    Comments:      Electronically signed by:  Jamie Charles, OT OTR/L, [facility]

## 2023-05-29 ENCOUNTER — HOSPITAL ENCOUNTER (EMERGENCY)
Age: 38
Discharge: HOME | End: 2023-05-29
Payer: COMMERCIAL

## 2023-05-29 ENCOUNTER — APPOINTMENT (OUTPATIENT)
Dept: GENERAL RADIOLOGY | Age: 38
End: 2023-05-29
Payer: COMMERCIAL

## 2023-05-29 VITALS
DIASTOLIC BLOOD PRESSURE: 80 MMHG | SYSTOLIC BLOOD PRESSURE: 117 MMHG | RESPIRATION RATE: 20 BRPM | HEART RATE: 90 BPM | HEIGHT: 65 IN | TEMPERATURE: 99.2 F | WEIGHT: 176 LBS | BODY MASS INDEX: 29.32 KG/M2 | OXYGEN SATURATION: 100 %

## 2023-05-29 DIAGNOSIS — S82.831A CLOSED AVULSION FRACTURE OF DISTAL FIBULA, RIGHT, INITIAL ENCOUNTER: Primary | ICD-10-CM

## 2023-05-29 PROCEDURE — 73610 X-RAY EXAM OF ANKLE: CPT

## 2023-05-29 PROCEDURE — 6370000000 HC RX 637 (ALT 250 FOR IP): Performed by: NURSE PRACTITIONER

## 2023-05-29 PROCEDURE — 99283 EMERGENCY DEPT VISIT LOW MDM: CPT

## 2023-05-29 RX ORDER — HYDROCODONE BITARTRATE AND ACETAMINOPHEN 5; 325 MG/1; MG/1
1 TABLET ORAL ONCE
Status: COMPLETED | OUTPATIENT
Start: 2023-05-29 | End: 2023-05-29

## 2023-05-29 RX ORDER — HYDROCODONE BITARTRATE AND ACETAMINOPHEN 5; 325 MG/1; MG/1
1 TABLET ORAL EVERY 4 HOURS PRN
Qty: 18 TABLET | Refills: 0 | Status: SHIPPED | OUTPATIENT
Start: 2023-05-29 | End: 2023-06-01

## 2023-05-29 RX ORDER — IBUPROFEN 600 MG/1
600 TABLET ORAL 4 TIMES DAILY PRN
Qty: 40 TABLET | Refills: 0 | Status: SHIPPED | OUTPATIENT
Start: 2023-05-29

## 2023-05-29 RX ORDER — IBUPROFEN 600 MG/1
600 TABLET ORAL ONCE
Status: COMPLETED | OUTPATIENT
Start: 2023-05-29 | End: 2023-05-29

## 2023-05-29 RX ADMIN — HYDROCODONE BITARTRATE AND ACETAMINOPHEN 1 TABLET: 5; 325 TABLET ORAL at 19:37

## 2023-05-29 RX ADMIN — IBUPROFEN 600 MG: 600 TABLET, FILM COATED ORAL at 19:37

## 2023-05-29 ASSESSMENT — ENCOUNTER SYMPTOMS
VOMITING: 0
ABDOMINAL PAIN: 0
SHORTNESS OF BREATH: 0
CHEST TIGHTNESS: 0
NAUSEA: 0
DIARRHEA: 0

## 2023-05-29 ASSESSMENT — PAIN DESCRIPTION - DESCRIPTORS: DESCRIPTORS: ACHING

## 2023-05-29 ASSESSMENT — LIFESTYLE VARIABLES
HOW MANY STANDARD DRINKS CONTAINING ALCOHOL DO YOU HAVE ON A TYPICAL DAY: PATIENT DOES NOT DRINK
HOW OFTEN DO YOU HAVE A DRINK CONTAINING ALCOHOL: NEVER

## 2023-05-29 ASSESSMENT — PAIN DESCRIPTION - LOCATION: LOCATION: ANKLE

## 2023-05-29 ASSESSMENT — PAIN DESCRIPTION - ORIENTATION: ORIENTATION: RIGHT

## 2023-05-29 ASSESSMENT — PAIN - FUNCTIONAL ASSESSMENT: PAIN_FUNCTIONAL_ASSESSMENT: 0-10

## 2023-05-29 ASSESSMENT — PAIN SCALES - GENERAL: PAINLEVEL_OUTOF10: 9

## 2023-05-29 NOTE — ED PROVIDER NOTES
[facility]        Pt Name: Ricardo Delatorre  MRN: [id number]  [place] 1985  Date of evaluation: 5/29/2023  Provider: GORDON Orozco - RADHA  PCP: Montse Pierce MD  Note Started: 7:49 PM EDT 5/29/23      EDGAR. I have evaluated this patient. CHIEF COMPLAINT       Chief Complaint   Patient presents with    Fall     Pt w c/o R ankle pain (9/10) after fall while playing with her kids on playground. Pt stated twisting her ankle. HISTORY OF PRESENT ILLNESS: 1 or more Elements     History from : Patient    Limitations to history : None    Ricardo Delatorre is a 42 y.o. female who presents to the emergency department with complaint of right ankle injury. The patient has pain to the medial and lateral right ankle. No pain to the foot or posterior lower leg. States that she was playing with her kids at the playground when she accidentally tripped and fell. She does recall twisting the ankle. Unable to bear weight. No medications prior to arrival.    Denies any headache, fever, lightheadedness, dizziness, visual disturbances. No chest pain or pressure. No neck or back pain. No shortness of breath, cough, or congestion. No abdominal pain, nausea, vomiting, diarrhea, constipation, or dysuria. No rash. Nursing Notes were all reviewed and agreed with or any disagreements were addressed in the HPI. REVIEW OF SYSTEMS :      Review of Systems   Constitutional:  Negative for activity change, chills and fever. Respiratory:  Negative for chest tightness and shortness of breath. Cardiovascular:  Negative for chest pain. Gastrointestinal:  Negative for abdominal pain, diarrhea, nausea and vomiting. Genitourinary:  Negative for dysuria. Musculoskeletal:  Positive for joint swelling. Right ankle injury   All other systems reviewed and are negative. Positives and Pertinent negatives as per HPI.

## 2023-05-30 ENCOUNTER — TELEPHONE (OUTPATIENT)
Dept: ORTHOPEDIC SURGERY | Age: 38
End: 2023-05-30

## 2023-05-30 NOTE — TELEPHONE ENCOUNTER
Appointment Request     Patient requesting earlier appointment: Yes  Appointment offered to patient: 6-8-23  PREFERS TO SEE PHYSICIAN  NP R ANKLE /ER XR FX  Patient Contact Number: [phone]

## 2023-05-31 ENCOUNTER — TELEPHONE (OUTPATIENT)
Dept: ORTHOPEDIC SURGERY | Age: 38
End: 2023-05-31

## 2023-05-31 ENCOUNTER — OFFICE VISIT (OUTPATIENT)
Dept: ORTHOPEDIC SURGERY | Age: 38
End: 2023-05-31

## 2023-05-31 VITALS — WEIGHT: 176 LBS | HEIGHT: 65 IN | BODY MASS INDEX: 29.32 KG/M2 | RESPIRATION RATE: 18 BRPM

## 2023-05-31 DIAGNOSIS — S82.61XA CLOSED FRACTURE OF PROXIMAL LATERAL MALLEOLUS OF RIGHT FIBULA, INITIAL ENCOUNTER: Primary | ICD-10-CM

## 2023-05-31 NOTE — PROGRESS NOTES
CHIEF COMPLAINT: Right ankle pain / lateral malleolus fracture. DATE OF INJURY: 5/29/2023    HISTORY:  Ms. Nash Flatten 42 y.o.  female presents today for the first visit for evaluation of a right ankle injury which occurred when she was playing with her kids in the park and tripped. She was first seen and evaluated in , where she was x-rayed, splinted and asked to f/u with Orthopedics. She is complaining of lateral ankle pain and swelling 8/10. This is better with elevation and worse with bearing any wt. The pain is sharp and not radiating. No numbness or tingling sensation. Alleviating factors: elevation and rest. No other complaint.      Past Medical History:   Diagnosis Date    Anemia 9/22/2013    Irregular menses 5/13/2013    Postpartum hypertension 5/29/2018    Stress incontinence, female 7/9/2021       Past Surgical History:   Procedure Laterality Date    BREAST SURGERY      URETHRAL SURGERY N/A 9/27/2021    ANTERIOR REPAIR, SUBURETHRAL SLING RETROPUBIC, CYSTOSCOPY performed by Kimberlyn Talley MD at [address] [place] [address] History     Socioeconomic History    Marital status:      Spouse name: Not on file    Number of children: Not on file    Years of education: Not on file    Highest education level: Not on file   Occupational History    Not on file   Tobacco Use    Smoking status: Never    Smokeless tobacco: Never   Vaping Use    Vaping Use: Never used   Substance and Sexual Activity    Alcohol use: No    Drug use: No    Sexual activity: Yes     Partners: Male   Other Topics Concern    Not on file   Social History Narrative    Not on file     Social Determinants of Health     Financial Resource Strain: Not on file   Food Insecurity: Not on file   Transportation Needs: Not on file   Physical Activity: Not on file   Stress: Not on file   Social Connections: Not on file   Intimate Partner Violence: Not on file   Housing Stability: Not on file       Family History   Problem Relation Age of

## 2023-05-31 NOTE — TELEPHONE ENCOUNTER
CPT: 11473  AUTH: [employer] PER WEBSITE  INSURANCE: [employer]  LOCATION WEST  AREA OF SX RT DISTAL FIBULAR  NOTE: DOC SCANNED

## 2023-06-01 ENCOUNTER — TELEPHONE (OUTPATIENT)
Dept: ORTHOPEDIC SURGERY | Age: 38
End: 2023-06-01

## 2023-06-02 ENCOUNTER — ANESTHESIA EVENT (OUTPATIENT)
Dept: OPERATING ROOM | Age: 38
End: 2023-06-02
Payer: COMMERCIAL

## 2023-06-05 ENCOUNTER — ANESTHESIA (OUTPATIENT)
Dept: OPERATING ROOM | Age: 38
End: 2023-06-05
Payer: COMMERCIAL

## 2023-06-05 ENCOUNTER — HOSPITAL ENCOUNTER (OUTPATIENT)
Age: 38
Setting detail: OUTPATIENT SURGERY
Discharge: HOME | End: 2023-06-05
Attending: ORTHOPAEDIC SURGERY | Admitting: ORTHOPAEDIC SURGERY
Payer: COMMERCIAL

## 2023-06-05 ENCOUNTER — APPOINTMENT (OUTPATIENT)
Dept: GENERAL RADIOLOGY | Age: 38
End: 2023-06-05
Attending: ORTHOPAEDIC SURGERY
Payer: COMMERCIAL

## 2023-06-05 VITALS
BODY MASS INDEX: 29.82 KG/M2 | TEMPERATURE: 97 F | WEIGHT: 179 LBS | HEIGHT: 65 IN | HEART RATE: 70 BPM | DIASTOLIC BLOOD PRESSURE: 79 MMHG | OXYGEN SATURATION: 98 % | SYSTOLIC BLOOD PRESSURE: 118 MMHG | RESPIRATION RATE: 16 BRPM

## 2023-06-05 DIAGNOSIS — S82.61XA CLOSED FRACTURE OF PROXIMAL LATERAL MALLEOLUS OF RIGHT FIBULA, INITIAL ENCOUNTER: Primary | ICD-10-CM

## 2023-06-05 LAB — HCG UR QL: NEGATIVE

## 2023-06-05 PROCEDURE — 6370000000 HC RX 637 (ALT 250 FOR IP): Performed by: ANESTHESIOLOGY

## 2023-06-05 PROCEDURE — 2580000003 HC RX 258: Performed by: ANESTHESIOLOGY

## 2023-06-05 PROCEDURE — 84703 CHORIONIC GONADOTROPIN ASSAY: CPT

## 2023-06-05 PROCEDURE — 2720000010 HC SURG SUPPLY STERILE: Performed by: ORTHOPAEDIC SURGERY

## 2023-06-05 PROCEDURE — 6360000002 HC RX W HCPCS: Performed by: ORTHOPAEDIC SURGERY

## 2023-06-05 PROCEDURE — 2500000003 HC RX 250 WO HCPCS: Performed by: NURSE ANESTHETIST, CERTIFIED REGISTERED

## 2023-06-05 PROCEDURE — 2580000003 HC RX 258: Performed by: ORTHOPAEDIC SURGERY

## 2023-06-05 PROCEDURE — 7100000010 HC PHASE II RECOVERY - FIRST 15 MIN: Performed by: ORTHOPAEDIC SURGERY

## 2023-06-05 PROCEDURE — 6360000002 HC RX W HCPCS: Performed by: NURSE ANESTHETIST, CERTIFIED REGISTERED

## 2023-06-05 PROCEDURE — 73600 X-RAY EXAM OF ANKLE: CPT

## 2023-06-05 PROCEDURE — 3600000004 HC SURGERY LEVEL 4 BASE: Performed by: ORTHOPAEDIC SURGERY

## 2023-06-05 PROCEDURE — 7100000011 HC PHASE II RECOVERY - ADDTL 15 MIN: Performed by: ORTHOPAEDIC SURGERY

## 2023-06-05 PROCEDURE — 2500000003 HC RX 250 WO HCPCS: Performed by: ORTHOPAEDIC SURGERY

## 2023-06-05 PROCEDURE — 3600000014 HC SURGERY LEVEL 4 ADDTL 15MIN: Performed by: ORTHOPAEDIC SURGERY

## 2023-06-05 PROCEDURE — 3700000000 HC ANESTHESIA ATTENDED CARE: Performed by: ORTHOPAEDIC SURGERY

## 2023-06-05 PROCEDURE — A4217 STERILE WATER/SALINE, 500 ML: HCPCS | Performed by: ORTHOPAEDIC SURGERY

## 2023-06-05 PROCEDURE — 7100000001 HC PACU RECOVERY - ADDTL 15 MIN: Performed by: ORTHOPAEDIC SURGERY

## 2023-06-05 PROCEDURE — 6360000002 HC RX W HCPCS: Performed by: ANESTHESIOLOGY

## 2023-06-05 PROCEDURE — 3209999900 FLUORO FOR SURGICAL PROCEDURES

## 2023-06-05 PROCEDURE — 7100000000 HC PACU RECOVERY - FIRST 15 MIN: Performed by: ORTHOPAEDIC SURGERY

## 2023-06-05 PROCEDURE — C1713 ANCHOR/SCREW BN/BN,TIS/BN: HCPCS | Performed by: ORTHOPAEDIC SURGERY

## 2023-06-05 PROCEDURE — 2709999900 HC NON-CHARGEABLE SUPPLY: Performed by: ORTHOPAEDIC SURGERY

## 2023-06-05 PROCEDURE — 3700000001 HC ADD 15 MINUTES (ANESTHESIA): Performed by: ORTHOPAEDIC SURGERY

## 2023-06-05 DEVICE — PLATE BNE L80MM 4 H R LAT DST PERIARTC FIBULAR S STL LOK: Type: IMPLANTABLE DEVICE | Site: ANKLE | Status: FUNCTIONAL

## 2023-06-05 DEVICE — SCREW BNE L16MM DIA3.5MM HD DIA2.7MM PERIARTC CORT S STL ST: Type: IMPLANTABLE DEVICE | Site: ANKLE | Status: FUNCTIONAL

## 2023-06-05 DEVICE — SCREW BNE L14MM DIA2.7MM HEX HD DIA2.5MM CANC BIODUR 108C: Type: IMPLANTABLE DEVICE | Site: ANKLE | Status: FUNCTIONAL

## 2023-06-05 DEVICE — SCREW BNE L16MM DIA2.7MM HEX HD DIA2.5MM CANC BIODUR 108C: Type: IMPLANTABLE DEVICE | Site: ANKLE | Status: FUNCTIONAL

## 2023-06-05 DEVICE — SCREW BNE L18MM DIA2.7MM HEX HD DIA2.5MM CANC BIODUR 108C: Type: IMPLANTABLE DEVICE | Site: ANKLE | Status: FUNCTIONAL

## 2023-06-05 DEVICE — SCREW BNE L12MM DIA3.5MM HD DIA2.7MM CORT PERIARTC S STL ST: Type: IMPLANTABLE DEVICE | Site: ANKLE | Status: FUNCTIONAL

## 2023-06-05 DEVICE — SCREW BNE L18MM DIA2.7MM CORT ANK FT S STL ST CANN: Type: IMPLANTABLE DEVICE | Site: ANKLE | Status: FUNCTIONAL

## 2023-06-05 RX ORDER — CEPHALEXIN 500 MG/1
500 CAPSULE ORAL 4 TIMES DAILY
Qty: 20 CAPSULE | Refills: 0 | Status: SHIPPED | OUTPATIENT
Start: 2023-06-05 | End: 2023-06-10

## 2023-06-05 RX ORDER — FENTANYL CITRATE 50 UG/ML
INJECTION, SOLUTION INTRAMUSCULAR; INTRAVENOUS PRN
Status: DISCONTINUED | OUTPATIENT
Start: 2023-06-05 | End: 2023-06-05 | Stop reason: SDUPTHER

## 2023-06-05 RX ORDER — SODIUM CHLORIDE 9 MG/ML
INJECTION, SOLUTION INTRAVENOUS PRN
Status: DISCONTINUED | OUTPATIENT
Start: 2023-06-05 | End: 2023-06-05 | Stop reason: HOSPADM

## 2023-06-05 RX ORDER — HYDROCODONE BITARTRATE AND ACETAMINOPHEN 5; 325 MG/1; MG/1
1 TABLET ORAL EVERY 6 HOURS PRN
Qty: 20 TABLET | Refills: 0 | Status: SHIPPED | OUTPATIENT
Start: 2023-06-05 | End: 2023-06-10

## 2023-06-05 RX ORDER — SCOLOPAMINE TRANSDERMAL SYSTEM 1 MG/1
1 PATCH, EXTENDED RELEASE TRANSDERMAL ONCE
Status: DISCONTINUED | OUTPATIENT
Start: 2023-06-05 | End: 2023-06-05 | Stop reason: HOSPADM

## 2023-06-05 RX ORDER — SODIUM CHLORIDE 0.9 % (FLUSH) 0.9 %
5-40 SYRINGE (ML) INJECTION PRN
Status: DISCONTINUED | OUTPATIENT
Start: 2023-06-05 | End: 2023-06-05 | Stop reason: HOSPADM

## 2023-06-05 RX ORDER — LORAZEPAM 2 MG/ML
0.5 INJECTION INTRAMUSCULAR
Status: DISCONTINUED | OUTPATIENT
Start: 2023-06-05 | End: 2023-06-05 | Stop reason: HOSPADM

## 2023-06-05 RX ORDER — DEXAMETHASONE SODIUM PHOSPHATE 4 MG/ML
INJECTION, SOLUTION INTRA-ARTICULAR; INTRALESIONAL; INTRAMUSCULAR; INTRAVENOUS; SOFT TISSUE PRN
Status: DISCONTINUED | OUTPATIENT
Start: 2023-06-05 | End: 2023-06-05 | Stop reason: SDUPTHER

## 2023-06-05 RX ORDER — HYDROCODONE BITARTRATE AND ACETAMINOPHEN 5; 325 MG/1; MG/1
1 TABLET ORAL
Status: COMPLETED | OUTPATIENT
Start: 2023-06-05 | End: 2023-06-05

## 2023-06-05 RX ORDER — PROPOFOL 10 MG/ML
INJECTION, EMULSION INTRAVENOUS PRN
Status: DISCONTINUED | OUTPATIENT
Start: 2023-06-05 | End: 2023-06-05 | Stop reason: SDUPTHER

## 2023-06-05 RX ORDER — FENTANYL CITRATE 50 UG/ML
50 INJECTION, SOLUTION INTRAMUSCULAR; INTRAVENOUS EVERY 5 MIN PRN
Status: COMPLETED | OUTPATIENT
Start: 2023-06-05 | End: 2023-06-05

## 2023-06-05 RX ORDER — MEPERIDINE HYDROCHLORIDE 25 MG/ML
12.5 INJECTION INTRAMUSCULAR; INTRAVENOUS; SUBCUTANEOUS
Status: DISCONTINUED | OUTPATIENT
Start: 2023-06-05 | End: 2023-06-05 | Stop reason: HOSPADM

## 2023-06-05 RX ORDER — LIDOCAINE HYDROCHLORIDE 20 MG/ML
INJECTION, SOLUTION EPIDURAL; INFILTRATION; INTRACAUDAL; PERINEURAL PRN
Status: DISCONTINUED | OUTPATIENT
Start: 2023-06-05 | End: 2023-06-05 | Stop reason: SDUPTHER

## 2023-06-05 RX ORDER — HALOPERIDOL 5 MG/ML
1 INJECTION INTRAMUSCULAR
Status: DISCONTINUED | OUTPATIENT
Start: 2023-06-05 | End: 2023-06-05 | Stop reason: HOSPADM

## 2023-06-05 RX ORDER — BUPIVACAINE HYDROCHLORIDE 5 MG/ML
INJECTION, SOLUTION EPIDURAL; INTRACAUDAL
Status: COMPLETED | OUTPATIENT
Start: 2023-06-05 | End: 2023-06-05

## 2023-06-05 RX ORDER — ONDANSETRON 2 MG/ML
4 INJECTION INTRAMUSCULAR; INTRAVENOUS
Status: DISCONTINUED | OUTPATIENT
Start: 2023-06-05 | End: 2023-06-05 | Stop reason: HOSPADM

## 2023-06-05 RX ORDER — LORAZEPAM 2 MG/ML
0.5 INJECTION INTRAMUSCULAR ONCE
Status: COMPLETED | OUTPATIENT
Start: 2023-06-05 | End: 2023-06-05

## 2023-06-05 RX ORDER — SODIUM CHLORIDE 0.9 % (FLUSH) 0.9 %
5-40 SYRINGE (ML) INJECTION EVERY 12 HOURS SCHEDULED
Status: DISCONTINUED | OUTPATIENT
Start: 2023-06-05 | End: 2023-06-05 | Stop reason: HOSPADM

## 2023-06-05 RX ORDER — ONDANSETRON 2 MG/ML
INJECTION INTRAMUSCULAR; INTRAVENOUS PRN
Status: DISCONTINUED | OUTPATIENT
Start: 2023-06-05 | End: 2023-06-05 | Stop reason: SDUPTHER

## 2023-06-05 RX ORDER — DIPHENHYDRAMINE HYDROCHLORIDE 50 MG/ML
12.5 INJECTION INTRAMUSCULAR; INTRAVENOUS
Status: DISCONTINUED | OUTPATIENT
Start: 2023-06-05 | End: 2023-06-05 | Stop reason: HOSPADM

## 2023-06-05 RX ORDER — MIDAZOLAM HYDROCHLORIDE 1 MG/ML
INJECTION INTRAMUSCULAR; INTRAVENOUS PRN
Status: DISCONTINUED | OUTPATIENT
Start: 2023-06-05 | End: 2023-06-05 | Stop reason: SDUPTHER

## 2023-06-05 RX ADMIN — ONDANSETRON 4 MG: 2 INJECTION INTRAMUSCULAR; INTRAVENOUS at 12:06

## 2023-06-05 RX ADMIN — MIDAZOLAM 2 MG: 1 INJECTION INTRAMUSCULAR; INTRAVENOUS at 12:01

## 2023-06-05 RX ADMIN — FENTANYL CITRATE 50 MCG: 50 INJECTION, SOLUTION INTRAMUSCULAR; INTRAVENOUS at 13:29

## 2023-06-05 RX ADMIN — DEXAMETHASONE SODIUM PHOSPHATE 10 MG: 4 INJECTION, SOLUTION INTRAMUSCULAR; INTRAVENOUS at 12:06

## 2023-06-05 RX ADMIN — FENTANYL CITRATE 50 MCG: 50 INJECTION, SOLUTION INTRAMUSCULAR; INTRAVENOUS at 13:14

## 2023-06-05 RX ADMIN — HYDROCODONE BITARTRATE AND ACETAMINOPHEN 1 TABLET: 5; 325 TABLET ORAL at 14:45

## 2023-06-05 RX ADMIN — FENTANYL CITRATE 50 MCG: 50 INJECTION, SOLUTION INTRAMUSCULAR; INTRAVENOUS at 13:23

## 2023-06-05 RX ADMIN — FENTANYL CITRATE 50 MCG: 50 INJECTION, SOLUTION INTRAMUSCULAR; INTRAVENOUS at 13:06

## 2023-06-05 RX ADMIN — LIDOCAINE HYDROCHLORIDE 100 MG: 20 INJECTION, SOLUTION EPIDURAL; INFILTRATION; INTRACAUDAL; PERINEURAL at 12:06

## 2023-06-05 RX ADMIN — LORAZEPAM 0.5 MG: 2 INJECTION INTRAMUSCULAR; INTRAVENOUS at 13:45

## 2023-06-05 RX ADMIN — FENTANYL CITRATE 50 MCG: 50 INJECTION INTRAMUSCULAR; INTRAVENOUS at 12:13

## 2023-06-05 RX ADMIN — PROPOFOL 200 MG: 10 INJECTION, EMULSION INTRAVENOUS at 12:06

## 2023-06-05 RX ADMIN — FENTANYL CITRATE 50 MCG: 50 INJECTION INTRAMUSCULAR; INTRAVENOUS at 12:06

## 2023-06-05 RX ADMIN — HYDROMORPHONE HYDROCHLORIDE 1 MG: 1 INJECTION, SOLUTION INTRAMUSCULAR; INTRAVENOUS; SUBCUTANEOUS at 12:37

## 2023-06-05 RX ADMIN — SODIUM CHLORIDE: 9 INJECTION, SOLUTION INTRAVENOUS at 12:01

## 2023-06-05 RX ADMIN — CEFAZOLIN 2000 MG: 2 INJECTION, POWDER, FOR SOLUTION INTRAMUSCULAR; INTRAVENOUS at 12:01

## 2023-06-05 ASSESSMENT — PAIN DESCRIPTION - ORIENTATION
ORIENTATION: RIGHT

## 2023-06-05 ASSESSMENT — PAIN - FUNCTIONAL ASSESSMENT
PAIN_FUNCTIONAL_ASSESSMENT: 0-10
PAIN_FUNCTIONAL_ASSESSMENT: PREVENTS OR INTERFERES SOME ACTIVE ACTIVITIES AND ADLS
PAIN_FUNCTIONAL_ASSESSMENT: PREVENTS OR INTERFERES SOME ACTIVE ACTIVITIES AND ADLS

## 2023-06-05 ASSESSMENT — PAIN SCALES - GENERAL
PAINLEVEL_OUTOF10: 9
PAINLEVEL_OUTOF10: 6
PAINLEVEL_OUTOF10: 6
PAINLEVEL_OUTOF10: 10
PAINLEVEL_OUTOF10: 6
PAINLEVEL_OUTOF10: 4

## 2023-06-05 ASSESSMENT — PAIN DESCRIPTION - DESCRIPTORS
DESCRIPTORS: THROBBING
DESCRIPTORS: ACHING;BURNING
DESCRIPTORS: THROBBING
DESCRIPTORS: ACHING
DESCRIPTORS: ACHING;BURNING

## 2023-06-05 ASSESSMENT — PAIN DESCRIPTION - LOCATION
LOCATION: FOOT
LOCATION: ANKLE

## 2023-06-05 ASSESSMENT — PAIN DESCRIPTION - PAIN TYPE
TYPE: SURGICAL PAIN
TYPE: SURGICAL PAIN

## 2023-06-05 ASSESSMENT — PAIN DESCRIPTION - FREQUENCY: FREQUENCY: CONTINUOUS

## 2023-06-05 ASSESSMENT — LIFESTYLE VARIABLES: SMOKING_STATUS: 0

## 2023-06-05 ASSESSMENT — PAIN DESCRIPTION - ONSET: ONSET: ON-GOING

## 2023-06-05 NOTE — H&P
Preoperative H&P Update    The patient's History and Physical in the medical record from 5/31/2023 was reviewed by me today. Past Medical History:   Diagnosis Date    Anemia 9/22/2013    Irregular menses 5/13/2013    Postpartum hypertension 5/29/2018    Stress incontinence, female 7/9/2021     Past Surgical History:   Procedure Laterality Date    BREAST SURGERY      URETHRAL SURGERY N/A 9/27/2021    ANTERIOR REPAIR, SUBURETHRAL SLING RETROPUBIC, CYSTOSCOPY performed by Dat Gomez MD at [place] [address]     No current facility-administered medications on file prior to encounter. Current Outpatient Medications on File Prior to Encounter   Medication Sig Dispense Refill    ibuprofen (ADVIL;MOTRIN) 600 MG tablet Take 1 tablet by mouth 4 times daily as needed for Pain 40 tablet 0       No Known Allergies   I reviewed the HPI, medications, allergies, reason for surgery, diagnosis and treatment plan and there has been no change. The patient was evaluated by me today. Physical exam findings for this update include:    Vitals:    06/05/23 1036   BP: 125/77   Pulse: 80   Resp: 16   Temp: 98.1 °F (36.7 °C)   SpO2: 99%     Airway is intact  Chest: chest clear, no wheezing, rales, normal symmetric air entry, no tachypnea, retractions or cyanosis  Heart: regular rate and rhythm ; heart sounds normal  Findings on exam of the body region where surgery is to be performed include:  Right ankle pain / lateral malleolus fracture.     Electronically signed by Brad Escoto MD on 6/5/2023 at 11:29 AM

## 2023-06-05 NOTE — DISCHARGE INSTRUCTIONS
Post op instruction:  1- D/C home  2- Dx Right ankle pain / lateral malleolus fracture. 3- NWB right ankle  4- Elevation surgical site, with ice  5- Keep splint dry and clean  6- F/U in 2 weeks.   7- For DVT prophylaxis- Aspirin 325 mg daily     Arlin Bass MD, 6/5/2023

## 2023-06-05 NOTE — PROGRESS NOTES
Patient admitted to PACU # 4 from OR at 1300 post open reduction internal fixation right distal fibula fracture per Dr Tapan Reagan. Attached to PACU monitoring system and report received from anesthesia provider. Patient was reported to be hemodynamically stable during procedure. Patient drowsy on admission and voiced pain to surgical site.

## 2023-06-05 NOTE — ANESTHESIA POSTPROCEDURE EVALUATION
[facility] Department of Anesthesiology  Post-Anesthesia Note       Name:  Carroll Flores                                  Age:  42 y.o. MRN:  [id number]     Last Vitals & Oxygen Saturation: /79   Pulse 70   Temp 97 °F (36.1 °C) (Temporal)   Resp 16   Ht 5' 5\" (1.651 m)   Wt 179 lb (81.2 kg)   LMP 05/22/2023 (Within Weeks)   SpO2 98%   BMI 29.79 kg/m²   Patient Vitals for the past 4 hrs:   BP Temp Temp src Pulse Resp SpO2   06/05/23 1550 118/79 97 °F (36.1 °C) Temporal 70 16 98 %   06/05/23 1422 (!) 124/92 97 °F (36.1 °C) Temporal 73 12 99 %   06/05/23 1408 -- -- -- 69 (!) 9 99 %   06/05/23 1351 -- -- -- -- -- 97 %   06/05/23 1320 114/75 -- -- 63 (!) 8 --   06/05/23 1315 (!) 137/92 -- -- 81 17 100 %   06/05/23 1314 (!) 137/92 -- -- 69 (!) 9 100 %   06/05/23 1310 (!) 125/92 -- -- 73 10 100 %   06/05/23 1305 126/89 -- -- 92 14 (!) 43 %   06/05/23 1300 (!) 135/107 97.5 °F (36.4 °C) Temporal 89 16 99 %       Level of consciousness:  Awake, alert    Respiratory: Respirations easy, no distress. Stable. Cardiovascular: Hemodynamically stable. Hydration: Adequate. PONV: Adequately managed. Post-op pain: Adequately controlled. Post-op assessment: Tolerated anesthetic well without complication. Complications:  None.     Jaquelin Bradshaw MD  June 5, 2023   4:08 PM
negative

## 2023-06-05 NOTE — ANESTHESIA PRE PROCEDURE
Past Surgical History:        Procedure Laterality Date    BREAST SURGERY      URETHRAL SURGERY N/A 9/27/2021    ANTERIOR REPAIR, SUBURETHRAL SLING RETROPUBIC, CYSTOSCOPY performed by Fracisco Forrest MD at [address] [place] [address] History:    Social History     Tobacco Use    Smoking status: Never    Smokeless tobacco: Never   Substance Use Topics    Alcohol use: No                                Counseling given: Not Answered      Vital Signs (Current): There were no vitals filed for this visit.                                            BP Readings from Last 3 Encounters:   06/05/23 125/77   05/29/23 117/80   03/02/22 125/70       NPO Status:                                                                                 BMI:   Wt Readings from Last 3 Encounters:   06/05/23 179 lb (81.2 kg)   05/31/23 176 lb (79.8 kg)   05/29/23 176 lb (79.8 kg)     There is no height or weight on file to calculate BMI.    CBC:   Lab Results   Component Value Date/Time    WBC 3.5 09/02/2021 09:46 AM    RBC 4.44 09/02/2021 09:46 AM    HGB 12.5 09/02/2021 09:46 AM    HCT 36.9 09/02/2021 09:46 AM    MCV 83.1 09/02/2021 09:46 AM    RDW 13.7 09/02/2021 09:46 AM     09/02/2021 09:46 AM       CMP:   Lab Results   Component Value Date/Time     03/28/2020 01:15 PM    K 3.9 03/28/2020 01:15 PM     03/28/2020 01:15 PM    CO2 23 03/28/2020 01:15 PM    BUN 12 03/28/2020 01:15 PM    CREATININE 0.9 03/28/2020 01:15 PM    GFRAA >60 03/28/2020 01:15 PM    GFRAA >60 02/02/2013 11:45 PM    AGRATIO 1.2 05/29/2018 10:00 PM    LABGLOM >60 03/28/2020 01:15 PM    GLUCOSE 90 03/28/2020 01:15 PM    PROT 6.4 05/29/2018 10:00 PM    PROT 7.1 02/02/2013 11:45 PM    CALCIUM 8.9 03/28/2020 01:15 PM    BILITOT <0.2 05/29/2018 10:00 PM    ALKPHOS 97 05/29/2018 10:00 PM    AST 36 05/29/2018 10:00 PM    ALT 77 05/29/2018 10:00 PM       POC Tests: No results for input(s): POCGLU, POCNA, POCK, POCCL, POCBUN, POCHEMO, POCHCT in the last 72

## 2023-06-05 NOTE — PROGRESS NOTES
Pt discharged in stable condition. Slight nausea and pt still has patch on - instruction given for removal. Pain 4/10 tolerable and pt states ready to go home. Pt sleepy , but able to wake up to move and go home. Pt had crackers and soda. Circ cks remain positive with toes warm and pt able to wiggle along with positive sensation. Instructed on watching circulation.

## 2023-06-06 NOTE — OP NOTE
[address] [place] [place] [address]                                OPERATIVE REPORT    PATIENT NAME: Bernard Ngo                 :        1985  MED REC NO:   [id number]                          ROOM:  ACCOUNT NO:   [de-identified]                           ADMIT DATE: 2023  PROVIDER:     Sylvia Tejada MD    DATE OF PROCEDURE:  2023    PRIMARY CARE:  Juliano Osborne MD    PREOPERATIVE DIAGNOSIS:  Right ankle lateral malleolus displaced  fracture. POSTOPERATIVE DIAGNOSIS:  Right ankle lateral malleolus displaced  fracture. OPERATION PERFORMED:  Open treatment of right ankle lateral malleolus  fracture open reduction and internal fixation. SURGEON:  Sylvia Tejada MD    ASSISTANTBebe Singh, Surgical Assistant. ANESTHESIA:  General anesthesia. ESTIMATED BLOOD LOSS:  Minimal.    COMPLICATIONS:  None. TOURNIQUET:  Right upper calf, 250 mmHg. IMPLANT USED:  Demetrius distal fibular locking plate and one lag screw. INDICATION:  This is a 49-year-old [facility] American female who sustained  an injury when she rolled her ankle while playing with her kids in the  park. She was found to have a displaced lateral malleolus fracture. All risks, benefits, and alternatives discussed with the patient. She  elected to proceed with surgical fixation. DESCRIPTION OF PROCEDURE:  Right ankle was marked. She received 2 gm  Ancef IV preoperatively. The patient was then brought to the operating  room and underwent general anesthesia. A well-padded tourniquet was  placed in the right upper calf. The right lower extremity was then  prepped and draped in regular sterile routine fashion. A time-out was  called confirming the patient's name, site, and procedure. Esmarch was used for exsanguination and tourniquet was inflated to 250  mmHg. A lateral incision was made over the distal fibula.   The fracture  was exposed,

## 2023-06-21 ENCOUNTER — OFFICE VISIT (OUTPATIENT)
Dept: ORTHOPEDIC SURGERY | Age: 38
End: 2023-06-21

## 2023-06-21 VITALS — BODY MASS INDEX: 29.82 KG/M2 | WEIGHT: 179 LBS | HEIGHT: 65 IN

## 2023-06-21 DIAGNOSIS — S82.61XA CLOSED FRACTURE OF PROXIMAL LATERAL MALLEOLUS OF RIGHT FIBULA, INITIAL ENCOUNTER: Primary | ICD-10-CM

## 2023-06-21 PROCEDURE — APPNB15 APP NON BILLABLE TIME 0-15 MINS: Performed by: NURSE PRACTITIONER

## 2023-06-21 PROCEDURE — 99024 POSTOP FOLLOW-UP VISIT: CPT | Performed by: NURSE PRACTITIONER

## 2023-06-21 NOTE — PROGRESS NOTES
DIAGNOSIS:  Right ankle lateral malleolus displaced fracture, status post ORIF. DATE OF SURGERY: 6/5/2023. HISTORY OF PRESENT ILLNESS:  Ms. Pratima Vargas 42 y.o.  female who came in today for 2 weeks postoperative visit. The patient denies any significant pain in the right ankle. Rates pain a 5/10 VAS moderate, aching, intermittent and are improving. Aggravating factors movement. Alleviating factors elevation and rest. She has been in a splint, and non WB. No numbness or tingling sensation. No fever or Chills. Denies smoking. PHYSICAL EXAMINATION:  The incision healing well. No signs of any erythema or drainage, minimal swelling. She has no pain with the active or passive range of motion of the right ankle, but decrease ROM. She has intact sensation distally, and she is neurovascularly intact. IMAGING:  Three views right ankle taken today in the office showed anatomic alignment of the fracture, plate and screws in good position, no loosening. Ankle mortise is well centered. IMPRESSION:  2 weeks out from right ankle lateral malleolus ORIF and doing very well. PLAN: She placed in a boot, and can start gradual WB. I have told the patient to work on ROM. ASA for DVT prophylaxis. The patient will come back for a follow up in 6 weeks. At that time, we will take 3 views of the right ankle standing.         GORDON Oleary - CNP

## 2023-08-11 ENCOUNTER — OFFICE VISIT (OUTPATIENT)
Dept: ORTHOPEDIC SURGERY | Age: 38
End: 2023-08-11

## 2023-08-11 VITALS — WEIGHT: 182 LBS | RESPIRATION RATE: 16 BRPM | BODY MASS INDEX: 30.32 KG/M2 | HEIGHT: 65 IN

## 2023-08-11 DIAGNOSIS — S82.61XA CLOSED FRACTURE OF PROXIMAL LATERAL MALLEOLUS OF RIGHT FIBULA, INITIAL ENCOUNTER: Primary | ICD-10-CM

## 2023-08-11 NOTE — PROGRESS NOTES
DIAGNOSIS:  Right ankle lateral malleolus displaced fracture, status post ORIF. DATE OF SURGERY: 6/5/2023. HISTORY OF PRESENT ILLNESS:  Ms. Eric Mclain 42 y.o.  female who came in today for 8 weeks postoperative visit. The patient denies any significant pain in the right ankle. Rates pain a 1/10 VAS mild,  aching, intermittent and are improving. Aggravating factors increased walking or when one of her young children kicks it. Alleviating factors rest. She has been WB and discontinued the boot this week. Overall she is feeling very well. No numbness or tingling sensation. No fever or Chills. Denies smoking. PHYSICAL EXAMINATION:  The incision healing well. No signs of any erythema or drainage, minimal swelling. She has no pain with the active or passive range of motion of the right ankle, minimal decrease ROM. She has intact sensation distally, and she is neurovascularly intact. IMAGING:  Three views right ankle taken today in the office showed anatomic alignment of the fracture, plate and screws in good position, no loosening. Ankle mortise is well centered. IMPRESSION:  8 weeks out from right ankle lateral malleolus ORIF and doing very well. PLAN: She will be WBAT in the boot, and start aggressive ROM and peroneal strengthening exercise. Off the boot. No heavy impact activities. The patient will come back for a follow up in 6 weeks. At that time, we will take 3 views of the right ankle standing.         GORDON Frazier - CNP

## (undated) DEVICE — NEEDLE HYPO 23GA L1.5IN TURQ POLYPR HUB S STL THN WALL IM

## (undated) DEVICE — SUTURE VCRL SZ 4-0 L27IN ABSRB UD L19MM FS-2 3/8 CIR REV J422H

## (undated) DEVICE — ZIMMER® STERILE DISPOSABLE TOURNIQUET CUFF WITH PLC, DUAL PORT, SINGLE BLADDER, 18 IN. (46 CM)

## (undated) DEVICE — BANDAGE COMPR W6INXL10YD ST M E WHITE/BEIGE

## (undated) DEVICE — CATHETER F BLLN 5CC 16FR 2 W HYDRGEL COAT LESS TRAUM LUB

## (undated) DEVICE — TOWEL,OR,DSP,ST,BLUE,STD,4/PK,20PK/CS: Brand: MEDLINE

## (undated) DEVICE — MAJOR SET UP: Brand: MEDLINE INDUSTRIES, INC.

## (undated) DEVICE — SOLUTION PREP POVIDONE IOD FOR SKIN MUCOUS MEM PRIOR TO

## (undated) DEVICE — SUTURE PROL SZ 2-0 L30IN NONABSORBABLE BLU L26MM SH 1/2 CIR 8833H

## (undated) DEVICE — GLOVE SURG SZ 65 THK91MIL LTX FREE SYN POLYISOPRENE

## (undated) DEVICE — ELECTRODE PT RET AD L9FT HI MOIST COND ADH HYDRGEL CORDED

## (undated) DEVICE — C-ARM: Brand: UNBRANDED

## (undated) DEVICE — COTTON UNDERCAST PADDING,CRIMPED FINISH: Brand: WEBRIL

## (undated) DEVICE — DRAINBAG,ANTI-REFLUX TOWER,L/F,2000ML,LL: Brand: MEDLINE

## (undated) DEVICE — SUTURE VCRL + SZ 2-0 L18IN ABSRB UD CT1 L36MM 1/2 CIR VCP839D

## (undated) DEVICE — LOWER EXTREMITY: Brand: MEDLINE INDUSTRIES, INC.

## (undated) DEVICE — GLOVE SURG SZ 8 CRM LTX FREE POLYISOPRENE POLYMER BEAD ANTI

## (undated) DEVICE — GOWN SIRUS NONREIN XL W/TWL: Brand: MEDLINE INDUSTRIES, INC.

## (undated) DEVICE — Z DISCONTINUED BY MEDLINE USE 2711682 TRAY SKIN PREP DRY W/ PREM GLV

## (undated) DEVICE — SKIN MARKER REGULAR TIP WITH RULER CAP AND LABELS: Brand: DEVON

## (undated) DEVICE — DRAPE,UNDERBUTTOCKS,PCH,STERILE: Brand: MEDLINE

## (undated) DEVICE — SYRINGE 20ML LL S/C 50

## (undated) DEVICE — 1016 S-DRAPE IRRIG POUCH 10/BOX: Brand: STERI-DRAPE™

## (undated) DEVICE — 3M™ STERI-STRIP™ COMPOUND BENZOIN TINCTURE 40 BAGS/CARTON 4 CARTONS/CASE C1544: Brand: 3M™ STERI-STRIP™

## (undated) DEVICE — TUBING, SUCTION, 1/4" X 12', STRAIGHT: Brand: MEDLINE

## (undated) DEVICE — Device

## (undated) DEVICE — SUTURE VCRL + SZ 3-0 L18IN ABSRB UD SH 1/2 CIR TAPERCUT NDL VCP864D

## (undated) DEVICE — STRIP,CLOSURE,WOUND,MEDI-STRIP,1/2X4: Brand: MEDLINE

## (undated) DEVICE — CATHETER URETH 18FR 2CC BLLN SIL ELASTMR F 2 W BARDX

## (undated) DEVICE — SOLUTION IV IRRIG POUR BRL 0.9% SODIUM CHL 2F7124

## (undated) DEVICE — CYSTO/BLADDER IRRIGATION SET, REGULATING CLAMP

## (undated) DEVICE — GAUZE,PACKING STRIP,IODOFORM,2"X5YD,STRL: Brand: CURAD

## (undated) DEVICE — SUTURE MCRYL + SZ 4-0 L18IN ABSRB UD L19MM PS-2 3/8 CIR MCP496G

## (undated) DEVICE — BASIC SINGLE BASIN 1-LF: Brand: MEDLINE INDUSTRIES, INC.

## (undated) DEVICE — SOLUTION SCRB 4OZ 10% POVIDONE IOD ANTIMIC BTL

## (undated) DEVICE — BIT DRL L100MM DIA2.5MM FOR SM FRAG UNIV LOK SYS

## (undated) DEVICE — SYRINGE MED 10ML LUERLOCK TIP W/O SFTY DISP

## (undated) DEVICE — BANDAGE COMPR W6INXL12FT SMOOTH FOR LIMB EXSANG ESMARCH

## (undated) DEVICE — SUTURE VCRL SZ 2-0 L18IN ABSRB VLT L26MM SH 1/2 CIR J775D

## (undated) DEVICE — SUTURE VCRL SZ 4-0 L27IN ABSRB UD L19MM PS-2 3/8 CIR PRIM J426H

## (undated) DEVICE — DRESSING,GAUZE,XEROFORM,CURAD,1"X8",ST: Brand: CURAD

## (undated) DEVICE — GLOVE SURG SZ 65 L12IN FNGR THK79MIL GRN LTX FREE

## (undated) DEVICE — SUTURE VCRL SZ 3-0 L27IN ABSRB UD L26MM SH 1/2 CIR J416H

## (undated) DEVICE — STRIP,CLOSURE,WOUND,MEDI-STRIP,1/4X3: Brand: MEDLINE

## (undated) DEVICE — BAG DRAINAGE 2 LT STRL ANRFLX LF

## (undated) DEVICE — NEEDLE HYPO 22GA L1 1/2IN PIVOTING SHLD FOR LUERLOCK SYR

## (undated) DEVICE — DRAPE LAP 104X35X124IN BLU CTRL + FAB REINF ABD FEN

## (undated) DEVICE — BIT DRL DIA2MM STD QUIK CONN FOR PERIARTC LOK PLT SYS

## (undated) DEVICE — MERCY HEALTH WEST TURNOVER: Brand: MEDLINE INDUSTRIES, INC.